# Patient Record
Sex: FEMALE | Race: OTHER | HISPANIC OR LATINO | URBAN - METROPOLITAN AREA
[De-identification: names, ages, dates, MRNs, and addresses within clinical notes are randomized per-mention and may not be internally consistent; named-entity substitution may affect disease eponyms.]

---

## 2022-03-14 ENCOUNTER — EMERGENCY (EMERGENCY)
Facility: HOSPITAL | Age: 52
LOS: 1 days | Discharge: HOME | End: 2022-03-14
Attending: STUDENT IN AN ORGANIZED HEALTH CARE EDUCATION/TRAINING PROGRAM | Admitting: STUDENT IN AN ORGANIZED HEALTH CARE EDUCATION/TRAINING PROGRAM
Payer: MEDICAID

## 2022-03-14 VITALS
TEMPERATURE: 97 F | OXYGEN SATURATION: 100 % | HEART RATE: 75 BPM | DIASTOLIC BLOOD PRESSURE: 55 MMHG | RESPIRATION RATE: 18 BRPM | SYSTOLIC BLOOD PRESSURE: 116 MMHG

## 2022-03-14 PROCEDURE — 99283 EMERGENCY DEPT VISIT LOW MDM: CPT

## 2022-03-14 PROCEDURE — 73564 X-RAY EXAM KNEE 4 OR MORE: CPT | Mod: 26,RT

## 2022-03-14 RX ORDER — IBUPROFEN 200 MG
400 TABLET ORAL ONCE
Refills: 0 | Status: COMPLETED | OUTPATIENT
Start: 2022-03-14 | End: 2022-03-14

## 2022-03-14 RX ORDER — ACETAMINOPHEN 500 MG
650 TABLET ORAL ONCE
Refills: 0 | Status: COMPLETED | OUTPATIENT
Start: 2022-03-14 | End: 2022-03-14

## 2022-03-14 RX ADMIN — Medication 400 MILLIGRAM(S): at 12:54

## 2022-03-14 RX ADMIN — Medication 650 MILLIGRAM(S): at 12:54

## 2022-03-14 NOTE — ED PROVIDER NOTE - NS ED ROS FT
CONSTITUTIONAL: No fever,  Pulmonary: no cough  CARDIOVASCULAR: No chest pain,  GI: No abdominal pain  MUSKULOSKELETAL: New pain in joints/muscles  ALL OTHER SYSTEMS NEGATIVE.

## 2022-03-14 NOTE — ED PROVIDER NOTE - PATIENT PORTAL LINK FT
You can access the FollowMyHealth Patient Portal offered by [facility] by registering at the following website: [email or web address]. By joining [employer]’s FollowMyHealth portal, you will also be able to view your health information using other applications (apps) compatible with our system.

## 2022-03-14 NOTE — ED PROVIDER NOTE - PHYSICAL EXAMINATION
CONSTITUTIONAL: Non-toxic, non-diaphoretic, in no apparent distress  HEAD: Normocephalic; atruamatic  EYES: EOM intact   ENMT: External appears normal; normal oropharynx, moist  NECK: grossly normal active ROM,  CARD: No cyanosis, good peripheral perfusion,  RESP: Normal chest excursion with respiration; no increased work of breathing  ABD: non-distended   EXT: Knees symmetric. No pain on passive ROM. No medial or lateral laxity. Negative anterior drawer test. NVI.  SKIN: Warm, dry, no rash  NEURO:  moving all extremities, no facial droop, no dysarthria      cn2-12 intact  5/5 all extremities

## 2022-03-14 NOTE — ED PROVIDER NOTE - PROGRESS NOTE DETAILS
the patient feels better, given ortho follow up. Return precautions reviewed. Patient verbalized understand of conditions for return and plan for follow up.

## 2022-03-14 NOTE — ED PROVIDER NOTE - NSFOLLOWUPINSTRUCTIONS_ED_ALL_ED_FT
[place] 400 mg de ibuprofeno cado 6 horas y tome 650 mg de acetaminophen o paracetamol cada 6 horas con comida tenga dolor.       LO QUE NECESITA SABER:  ¿Qué es la osteoartritis?  La osteoartritis ocurre cuando el cartílago (tejido que amortigua keysha articulación) se desgasta lentamente y causa que los huesos rocen entre ellos. Esta enfermedad es keysha afección a wendi plazo que con frecuencia afecta las timi, wilner, parte baja de la espalda, rodillas y caderas. La osteoartritis también se conoce reza artrosis o enfermedad degenerativa de las articulaciones.      ¿Qué aumenta mi riesgo de osteoartritis?  Ser mayor de 50 años  Un historial familiar de osteoartritis  Obesidad, hipertensión o azúcar kait en la david  Keysha lesión o infección en keysha articulación  Realiza movimientos repetitivos de ginette articulaciones en el trabajo o al practicar deportes  ¿Cuáles son los signos y síntomas de la osteoartritis?  Dolor en la articulación que empeora cuando usted mueve la articulación  Rigidez en la articulación que disminuye después que usted mueve la articulación  Disminución del rango de movimiento  Un crecimiento jessika de los huesos de los dedos de las timi y pies  Un cathie de raspado o un crujido cuando usted mueve baptiste articulación  ¿Cómo se diagnostica la osteoartritis?  Las radiografías son imágenes de los huesos en baptiste articulación. Un medio de contraste puede ser inyectado en baptiste articulación antes de samantha las radiografías. El medio de contraste ayudará a que baptiste articulación se pueda apreciar con más claridad en las radiografías. Dígale al médico si usted alguna vez ha tenido keysha reacción alérgica al líquido de contraste.    ¿Cómo se trata la osteoartritis?  Los objetivos del tratamiento para la osteoartritis son disminuir el dolor, fortalecer y mejorar el movimiento de la articulación. Es posible que usted necesite alguno de los siguientes:    Medicamentos:  Acetaminofén jose m el dolor y baja la fiebre. Está disponible sin receta médica. Pregunte la cantidad y la frecuencia con que debe tomarlos. Siga las indicaciones. Aleksander las etiquetas de todos los demás medicamentos que esté usando para saber si también contienen acetaminofén, o pregunte a baptiste médico o farmacéutico. El acetaminofén puede causar daño en el hígado cuando no se cecy de forma correcta. No use más de 4 gramos (4000 miligramos) en total de acetaminofeno en un día.  Los JEFFREY, reza el ibuprofeno, ayudan a disminuir la inflamación, el dolor y la fiebre. Donna medicamento está disponible con o sin keysha receta médica. Los JEFFREY pueden causar sangrado estomacal o problemas renales en ciertas personas. Si usted cecy un medicamento anticoagulante, siempre pregúntele a baptiste médico si los JEFFREY son seguros para usted. Siempre aleksander la etiqueta de donna medicamento y siga las instrucciones.  La crema de capsaicina puede ayudar a disminuir el dolor en baptiste articulación.  Puede administrarse podrían administrarse. Pregunte al médico cómo debe samantha donna medicamento de forma sarmiento. Algunos medicamentos recetados para el dolor contienen acetaminofén. No tome otros medicamentos que contengan acetaminofén sin consultarlo con baptiste médico. Demasiado acetaminofeno puede causar daño al hígado. Los medicamentos recetados para el dolor podrían causar estreñimiento. Pregunte a baptiste médico reza prevenir o tratar estreñimiento.  Keysha inyección de esteroides puede darse si ginette síntomas empeoran.  La fisioterapia se podría usar para enseñarle los ejercicios para mejorar el movimiento y la fortaleza, con el fin de disminuir el dolor en las articulaciones. Un fisioterapeuta puede  un área con ginette timi. Por ejemplo, puede  la pierna de ciertas maneras para tratar la osteoartritis en la cadera.  Un ultrasonido se puede usar para tratar la osteoartritis en ciertas áreas, reza la rodilla. El ultrasonido produce calor que puede aliviar el dolor.  La cirugía podría usarse si otros tratamientos no funcionan.    ¿Cómo puedo controlar los síntomas?  Manténgase activo. La actividad física puede reducir el dolor y mejorar baptiste habilidad de hacer ginette actividades diarias. Evite actividades que le causen dolor. Pregúntele a baptiste médico qué tipos de ejercicios son los adecuados para usted.  Mantenga un peso saludable. [place] ayuda a disminuir la presión en las articulaciones en baptiste espalda, caderas, rodillas, tobillos y pies. Pregúntele a baptiste médico cuál es el peso ideal para usted. Puede ayudarlo a crear un plan saludable de pérdida de peso si usted tiene sobrepeso.  Use calor o hielo en ginette articulaciones según le hayan indicado. El calor o el hielo pueden ayudar a disminuir el dolor, la inflamación y los espasmos musculares. Para el calor, use keysha almohadilla de calor eléctrica en temperatura baja lico 20 minutos o tome un baño tibio. Para el hielo, use un paquete de hielo o ponga hielo molido dentro de keysha bolsa plástica. [place]úbrala con keysha toalla antes de colocarla sobre la articulación. Aplique hielo lico 15 minutos cada hora.  Masajee los músculos alrededor de la articulación. Los masajes ayudan a aliviar el dolor y la rigidez. Baptiste médico o baptiste fisioterapeuta le puede indicar con qué frecuencia necesita hacerlos. Si usted tiene osteoartritis de cadera, es posible que otra persona necesite ayudarlo a masajear el área.  Use un bastón, unas muletas o un caminador si se lo indican. Estos ayudan a proteger y aliviar la presión en baptiste tobillo, rodilla y articulaciones de la cadera. También le pueden ordenar plantillas ortopédicas para ginette zapatos para disminuir la presión de ginette articulaciones.  Use zapatos sin tacones o de tacones bajos. [place] le servirá para aliviar el dolor y a disminuir la presión que se ejerce en las articulaciones de baptiste tobillo, rodilla y caderas.  ¿Cuándo obi llamar a mi médico?  Usted tiene dolor intenso.  Usted no puede  la articulación.  Tiene fiebre.  Baptiste articulación está yumiko y sensible.  Usted tiene preguntas o inquietudes acerca de baptiste condición o cuidado.  ACUERDOS SOBRE BAPTISTE CUIDADO:  Usted tiene el derecho de ayudar a planear baptiste cuidado. Aprenda todo lo que pueda sobre baptiste condición y reza darle tratamiento. Discuta ginette opciones de tratamiento con ginette médicos para decidir el cuidado que usted desea recibir. Usted siempre tiene el derecho de rechazar el tratamiento.Esta información es sólo para uso en educación. Baptiste intención no es darle un consejo médico sobre enfermedades o tratamientos. Colsulte con baptiste médico, enfermera o farmacéutico antes de seguir cualquier régimen médico para saber si es seguro y efectivo para usted.  Learn more about Osteoartritis  Care guides  Arthritis  Osteoarthritis  Further information  Always consult your healthcare provider to ensure the information displayed on this page applies to your personal circumstances.

## 2022-03-15 PROBLEM — E11.9 TYPE 2 DIABETES MELLITUS WITHOUT COMPLICATIONS: Chronic | Status: ACTIVE | Noted: 2022-03-14

## 2022-03-15 PROBLEM — Z00.00 ENCOUNTER FOR PREVENTIVE HEALTH EXAMINATION: Status: ACTIVE | Noted: 2022-03-15

## 2022-03-23 ENCOUNTER — NON-APPOINTMENT (OUTPATIENT)
Age: 52
End: 2022-03-23

## 2022-03-23 ENCOUNTER — APPOINTMENT (OUTPATIENT)
Dept: ORTHOPEDIC SURGERY | Facility: CLINIC | Age: 52
End: 2022-03-23
Payer: SELF-PAY

## 2022-03-23 VITALS
HEART RATE: 73 BPM | OXYGEN SATURATION: 98 % | BODY MASS INDEX: 32.78 KG/M2 | DIASTOLIC BLOOD PRESSURE: 72 MMHG | SYSTOLIC BLOOD PRESSURE: 112 MMHG | WEIGHT: 185 LBS | HEIGHT: 63 IN

## 2022-03-23 DIAGNOSIS — S89.91XA UNSPECIFIED INJURY OF RIGHT LOWER LEG, INITIAL ENCOUNTER: ICD-10-CM

## 2022-03-23 PROCEDURE — 20611 DRAIN/INJ JOINT/BURSA W/US: CPT | Mod: RT

## 2022-03-23 PROCEDURE — 99214 OFFICE O/P EST MOD 30 MIN: CPT | Mod: 25

## 2022-04-23 ENCOUNTER — INPATIENT (INPATIENT)
Facility: HOSPITAL | Age: 52
LOS: 2 days | Discharge: HOME | End: 2022-04-26
Attending: STUDENT IN AN ORGANIZED HEALTH CARE EDUCATION/TRAINING PROGRAM | Admitting: STUDENT IN AN ORGANIZED HEALTH CARE EDUCATION/TRAINING PROGRAM
Payer: SELF-PAY

## 2022-04-23 VITALS
SYSTOLIC BLOOD PRESSURE: 132 MMHG | TEMPERATURE: 98 F | DIASTOLIC BLOOD PRESSURE: 54 MMHG | HEART RATE: 67 BPM | RESPIRATION RATE: 18 BRPM | OXYGEN SATURATION: 100 %

## 2022-04-23 PROCEDURE — 99053 MED SERV 10PM-8AM 24 HR FAC: CPT

## 2022-04-23 PROCEDURE — 99285 EMERGENCY DEPT VISIT HI MDM: CPT

## 2022-04-24 ENCOUNTER — TRANSCRIPTION ENCOUNTER (OUTPATIENT)
Age: 52
End: 2022-04-24

## 2022-04-24 DIAGNOSIS — E11.9 TYPE 2 DIABETES MELLITUS WITHOUT COMPLICATIONS: ICD-10-CM

## 2022-04-24 DIAGNOSIS — J45.909 UNSPECIFIED ASTHMA, UNCOMPLICATED: ICD-10-CM

## 2022-04-24 DIAGNOSIS — K29.80 DUODENITIS WITHOUT BLEEDING: ICD-10-CM

## 2022-04-24 DIAGNOSIS — E03.9 HYPOTHYROIDISM, UNSPECIFIED: ICD-10-CM

## 2022-04-24 DIAGNOSIS — R74.8 ABNORMAL LEVELS OF OTHER SERUM ENZYMES: ICD-10-CM

## 2022-04-24 DIAGNOSIS — Z02.9 ENCOUNTER FOR ADMINISTRATIVE EXAMINATIONS, UNSPECIFIED: ICD-10-CM

## 2022-04-24 DIAGNOSIS — R10.9 UNSPECIFIED ABDOMINAL PAIN: ICD-10-CM

## 2022-04-24 LAB
ALBUMIN SERPL ELPH-MCNC: 3.8 G/DL — SIGNIFICANT CHANGE UP (ref 3.3–5)
ALBUMIN SERPL ELPH-MCNC: 3.9 G/DL — SIGNIFICANT CHANGE UP (ref 3.3–5)
ALP SERPL-CCNC: 148 U/L — HIGH (ref 40–120)
ALP SERPL-CCNC: 175 U/L — HIGH (ref 40–120)
ALT FLD-CCNC: 602 U/L — HIGH (ref 4–33)
ALT FLD-CCNC: 625 U/L — HIGH (ref 4–33)
ANION GAP SERPL CALC-SCNC: 11 MMOL/L — SIGNIFICANT CHANGE UP (ref 7–14)
ANION GAP SERPL CALC-SCNC: 13 MMOL/L — SIGNIFICANT CHANGE UP (ref 7–14)
APPEARANCE UR: CLEAR — SIGNIFICANT CHANGE UP
AST SERPL-CCNC: 285 U/L — HIGH (ref 4–32)
AST SERPL-CCNC: 293 U/L — HIGH (ref 4–32)
BASOPHILS # BLD AUTO: 0.01 K/UL — SIGNIFICANT CHANGE UP (ref 0–0.2)
BASOPHILS NFR BLD AUTO: 0.2 % — SIGNIFICANT CHANGE UP (ref 0–2)
BILIRUB SERPL-MCNC: 0.7 MG/DL — SIGNIFICANT CHANGE UP (ref 0.2–1.2)
BILIRUB SERPL-MCNC: 0.9 MG/DL — SIGNIFICANT CHANGE UP (ref 0.2–1.2)
BILIRUB UR-MCNC: NEGATIVE — SIGNIFICANT CHANGE UP
BLOOD GAS VENOUS COMPREHENSIVE RESULT: SIGNIFICANT CHANGE UP
BUN SERPL-MCNC: 10 MG/DL — SIGNIFICANT CHANGE UP (ref 7–23)
BUN SERPL-MCNC: 14 MG/DL — SIGNIFICANT CHANGE UP (ref 7–23)
CALCIUM SERPL-MCNC: 8.6 MG/DL — SIGNIFICANT CHANGE UP (ref 8.4–10.5)
CALCIUM SERPL-MCNC: 8.8 MG/DL — SIGNIFICANT CHANGE UP (ref 8.4–10.5)
CHLORIDE SERPL-SCNC: 101 MMOL/L — SIGNIFICANT CHANGE UP (ref 98–107)
CHLORIDE SERPL-SCNC: 102 MMOL/L — SIGNIFICANT CHANGE UP (ref 98–107)
CO2 SERPL-SCNC: 20 MMOL/L — LOW (ref 22–31)
CO2 SERPL-SCNC: 22 MMOL/L — SIGNIFICANT CHANGE UP (ref 22–31)
COLOR SPEC: SIGNIFICANT CHANGE UP
CREAT SERPL-MCNC: 0.46 MG/DL — LOW (ref 0.5–1.3)
CREAT SERPL-MCNC: 0.62 MG/DL — SIGNIFICANT CHANGE UP (ref 0.5–1.3)
DIFF PNL FLD: NEGATIVE — SIGNIFICANT CHANGE UP
EGFR: 108 ML/MIN/1.73M2 — SIGNIFICANT CHANGE UP
EGFR: 116 ML/MIN/1.73M2 — SIGNIFICANT CHANGE UP
EOSINOPHIL # BLD AUTO: 0.16 K/UL — SIGNIFICANT CHANGE UP (ref 0–0.5)
EOSINOPHIL NFR BLD AUTO: 2.5 % — SIGNIFICANT CHANGE UP (ref 0–6)
GLUCOSE BLDC GLUCOMTR-MCNC: 100 MG/DL — HIGH (ref 70–99)
GLUCOSE BLDC GLUCOMTR-MCNC: 162 MG/DL — HIGH (ref 70–99)
GLUCOSE BLDC GLUCOMTR-MCNC: 171 MG/DL — HIGH (ref 70–99)
GLUCOSE SERPL-MCNC: 112 MG/DL — HIGH (ref 70–99)
GLUCOSE SERPL-MCNC: 151 MG/DL — HIGH (ref 70–99)
GLUCOSE UR QL: ABNORMAL
HCT VFR BLD CALC: 41.9 % — SIGNIFICANT CHANGE UP (ref 34.5–45)
HGB BLD-MCNC: 13.8 G/DL — SIGNIFICANT CHANGE UP (ref 11.5–15.5)
IANC: 3.59 K/UL — SIGNIFICANT CHANGE UP (ref 1.8–7.4)
IMM GRANULOCYTES NFR BLD AUTO: 0.3 % — SIGNIFICANT CHANGE UP (ref 0–1.5)
KETONES UR-MCNC: NEGATIVE — SIGNIFICANT CHANGE UP
LEUKOCYTE ESTERASE UR-ACNC: NEGATIVE — SIGNIFICANT CHANGE UP
LIDOCAIN IGE QN: 49 U/L — SIGNIFICANT CHANGE UP (ref 7–60)
LYMPHOCYTES # BLD AUTO: 1.89 K/UL — SIGNIFICANT CHANGE UP (ref 1–3.3)
LYMPHOCYTES # BLD AUTO: 29.3 % — SIGNIFICANT CHANGE UP (ref 13–44)
MAGNESIUM SERPL-MCNC: 2.8 MG/DL — HIGH (ref 1.6–2.6)
MCHC RBC-ENTMCNC: 30.1 PG — SIGNIFICANT CHANGE UP (ref 27–34)
MCHC RBC-ENTMCNC: 32.9 GM/DL — SIGNIFICANT CHANGE UP (ref 32–36)
MCV RBC AUTO: 91.3 FL — SIGNIFICANT CHANGE UP (ref 80–100)
MONOCYTES # BLD AUTO: 0.77 K/UL — SIGNIFICANT CHANGE UP (ref 0–0.9)
MONOCYTES NFR BLD AUTO: 12 % — SIGNIFICANT CHANGE UP (ref 2–14)
NEUTROPHILS # BLD AUTO: 3.59 K/UL — SIGNIFICANT CHANGE UP (ref 1.8–7.4)
NEUTROPHILS NFR BLD AUTO: 55.7 % — SIGNIFICANT CHANGE UP (ref 43–77)
NITRITE UR-MCNC: NEGATIVE — SIGNIFICANT CHANGE UP
NRBC # BLD: 0 /100 WBCS — SIGNIFICANT CHANGE UP
NRBC # FLD: 0 K/UL — SIGNIFICANT CHANGE UP
PH UR: 8 — SIGNIFICANT CHANGE UP (ref 5–8)
PHOSPHATE SERPL-MCNC: 3.2 MG/DL — SIGNIFICANT CHANGE UP (ref 2.5–4.5)
PLATELET # BLD AUTO: 249 K/UL — SIGNIFICANT CHANGE UP (ref 150–400)
POTASSIUM SERPL-MCNC: 3.7 MMOL/L — SIGNIFICANT CHANGE UP (ref 3.5–5.3)
POTASSIUM SERPL-MCNC: 5.4 MMOL/L — HIGH (ref 3.5–5.3)
POTASSIUM SERPL-SCNC: 3.7 MMOL/L — SIGNIFICANT CHANGE UP (ref 3.5–5.3)
POTASSIUM SERPL-SCNC: 5.4 MMOL/L — HIGH (ref 3.5–5.3)
PROT SERPL-MCNC: 7.3 G/DL — SIGNIFICANT CHANGE UP (ref 6–8.3)
PROT SERPL-MCNC: 8.1 G/DL — SIGNIFICANT CHANGE UP (ref 6–8.3)
PROT UR-MCNC: NEGATIVE — SIGNIFICANT CHANGE UP
RBC # BLD: 4.59 M/UL — SIGNIFICANT CHANGE UP (ref 3.8–5.2)
RBC # FLD: 13.5 % — SIGNIFICANT CHANGE UP (ref 10.3–14.5)
SARS-COV-2 RNA SPEC QL NAA+PROBE: SIGNIFICANT CHANGE UP
SODIUM SERPL-SCNC: 134 MMOL/L — LOW (ref 135–145)
SODIUM SERPL-SCNC: 135 MMOL/L — SIGNIFICANT CHANGE UP (ref 135–145)
SP GR SPEC: 1.02 — SIGNIFICANT CHANGE UP (ref 1–1.05)
TRIGL SERPL-MCNC: 187 MG/DL — HIGH
UROBILINOGEN FLD QL: SIGNIFICANT CHANGE UP
WBC # BLD: 6.44 K/UL — SIGNIFICANT CHANGE UP (ref 3.8–10.5)
WBC # FLD AUTO: 6.44 K/UL — SIGNIFICANT CHANGE UP (ref 3.8–10.5)

## 2022-04-24 PROCEDURE — 76705 ECHO EXAM OF ABDOMEN: CPT | Mod: 26

## 2022-04-24 PROCEDURE — 71045 X-RAY EXAM CHEST 1 VIEW: CPT | Mod: 26

## 2022-04-24 PROCEDURE — 99223 1ST HOSP IP/OBS HIGH 75: CPT | Mod: GC

## 2022-04-24 PROCEDURE — 74177 CT ABD & PELVIS W/CONTRAST: CPT | Mod: 26,MA

## 2022-04-24 RX ORDER — DEXTROSE 50 % IN WATER 50 %
25 SYRINGE (ML) INTRAVENOUS ONCE
Refills: 0 | Status: DISCONTINUED | OUTPATIENT
Start: 2022-04-24 | End: 2022-04-26

## 2022-04-24 RX ORDER — DEXTROSE 50 % IN WATER 50 %
12.5 SYRINGE (ML) INTRAVENOUS ONCE
Refills: 0 | Status: DISCONTINUED | OUTPATIENT
Start: 2022-04-24 | End: 2022-04-26

## 2022-04-24 RX ORDER — ONDANSETRON 8 MG/1
4 TABLET, FILM COATED ORAL EVERY 8 HOURS
Refills: 0 | Status: DISCONTINUED | OUTPATIENT
Start: 2022-04-24 | End: 2022-04-26

## 2022-04-24 RX ORDER — ALBUTEROL 90 UG/1
2 AEROSOL, METERED ORAL
Qty: 0 | Refills: 0 | DISCHARGE

## 2022-04-24 RX ORDER — ALBUTEROL 90 UG/1
2 AEROSOL, METERED ORAL EVERY 6 HOURS
Refills: 0 | Status: DISCONTINUED | OUTPATIENT
Start: 2022-04-24 | End: 2022-04-26

## 2022-04-24 RX ORDER — OMEGA-3 ACID ETHYL ESTERS 1 G
1 CAPSULE ORAL
Qty: 0 | Refills: 0 | DISCHARGE

## 2022-04-24 RX ORDER — PANTOPRAZOLE SODIUM 20 MG/1
40 TABLET, DELAYED RELEASE ORAL EVERY 12 HOURS
Refills: 0 | Status: DISCONTINUED | OUTPATIENT
Start: 2022-04-24 | End: 2022-04-24

## 2022-04-24 RX ORDER — LANOLIN ALCOHOL/MO/W.PET/CERES
3 CREAM (GRAM) TOPICAL AT BEDTIME
Refills: 0 | Status: DISCONTINUED | OUTPATIENT
Start: 2022-04-24 | End: 2022-04-26

## 2022-04-24 RX ORDER — EMPAGLIFLOZIN 10 MG/1
1 TABLET, FILM COATED ORAL
Qty: 0 | Refills: 0 | DISCHARGE

## 2022-04-24 RX ORDER — DEXTROSE 50 % IN WATER 50 %
15 SYRINGE (ML) INTRAVENOUS ONCE
Refills: 0 | Status: DISCONTINUED | OUTPATIENT
Start: 2022-04-24 | End: 2022-04-26

## 2022-04-24 RX ORDER — INSULIN LISPRO 100/ML
VIAL (ML) SUBCUTANEOUS
Refills: 0 | Status: DISCONTINUED | OUTPATIENT
Start: 2022-04-24 | End: 2022-04-26

## 2022-04-24 RX ORDER — CHOLECALCIFEROL (VITAMIN D3) 125 MCG
1 CAPSULE ORAL
Qty: 0 | Refills: 0 | DISCHARGE

## 2022-04-24 RX ORDER — FAMOTIDINE 10 MG/ML
20 INJECTION INTRAVENOUS DAILY
Refills: 0 | Status: DISCONTINUED | OUTPATIENT
Start: 2022-04-24 | End: 2022-04-24

## 2022-04-24 RX ORDER — INSULIN LISPRO 100/ML
VIAL (ML) SUBCUTANEOUS AT BEDTIME
Refills: 0 | Status: DISCONTINUED | OUTPATIENT
Start: 2022-04-24 | End: 2022-04-26

## 2022-04-24 RX ORDER — FAMOTIDINE 10 MG/ML
20 INJECTION INTRAVENOUS ONCE
Refills: 0 | Status: COMPLETED | OUTPATIENT
Start: 2022-04-24 | End: 2022-04-24

## 2022-04-24 RX ORDER — SODIUM CHLORIDE 9 MG/ML
1000 INJECTION INTRAMUSCULAR; INTRAVENOUS; SUBCUTANEOUS ONCE
Refills: 0 | Status: COMPLETED | OUTPATIENT
Start: 2022-04-24 | End: 2022-04-24

## 2022-04-24 RX ORDER — LEVOTHYROXINE SODIUM 125 MCG
75 TABLET ORAL DAILY
Refills: 0 | Status: DISCONTINUED | OUTPATIENT
Start: 2022-04-24 | End: 2022-04-26

## 2022-04-24 RX ORDER — LEVOTHYROXINE SODIUM 125 MCG
1 TABLET ORAL
Qty: 0 | Refills: 0 | DISCHARGE

## 2022-04-24 RX ORDER — MORPHINE SULFATE 50 MG/1
4 CAPSULE, EXTENDED RELEASE ORAL ONCE
Refills: 0 | Status: DISCONTINUED | OUTPATIENT
Start: 2022-04-24 | End: 2022-04-24

## 2022-04-24 RX ORDER — CHOLECALCIFEROL (VITAMIN D3) 125 MCG
1000 CAPSULE ORAL DAILY
Refills: 0 | Status: DISCONTINUED | OUTPATIENT
Start: 2022-04-24 | End: 2022-04-26

## 2022-04-24 RX ORDER — ENOXAPARIN SODIUM 100 MG/ML
40 INJECTION SUBCUTANEOUS EVERY 24 HOURS
Refills: 0 | Status: DISCONTINUED | OUTPATIENT
Start: 2022-04-24 | End: 2022-04-26

## 2022-04-24 RX ORDER — SITAGLIPTIN AND METFORMIN HYDROCHLORIDE 500; 50 MG/1; MG/1
1 TABLET, FILM COATED ORAL
Qty: 0 | Refills: 0 | DISCHARGE

## 2022-04-24 RX ORDER — OMEGA-3 ACID ETHYL ESTERS 1 G
4 CAPSULE ORAL DAILY
Refills: 0 | Status: DISCONTINUED | OUTPATIENT
Start: 2022-04-24 | End: 2022-04-26

## 2022-04-24 RX ORDER — GLUCAGON INJECTION, SOLUTION 0.5 MG/.1ML
1 INJECTION, SOLUTION SUBCUTANEOUS ONCE
Refills: 0 | Status: DISCONTINUED | OUTPATIENT
Start: 2022-04-24 | End: 2022-04-26

## 2022-04-24 RX ADMIN — Medication 4 GRAM(S): at 17:26

## 2022-04-24 RX ADMIN — SODIUM CHLORIDE 1000 MILLILITER(S): 9 INJECTION INTRAMUSCULAR; INTRAVENOUS; SUBCUTANEOUS at 00:48

## 2022-04-24 RX ADMIN — FAMOTIDINE 20 MILLIGRAM(S): 10 INJECTION INTRAVENOUS at 00:48

## 2022-04-24 RX ADMIN — Medication 1000 UNIT(S): at 17:26

## 2022-04-24 RX ADMIN — Medication 1: at 18:19

## 2022-04-24 RX ADMIN — MORPHINE SULFATE 4 MILLIGRAM(S): 50 CAPSULE, EXTENDED RELEASE ORAL at 00:48

## 2022-04-24 RX ADMIN — Medication 30 MILLILITER(S): at 00:47

## 2022-04-24 NOTE — ED PROVIDER NOTE - CLINICAL SUMMARY MEDICAL DECISION MAKING FREE TEXT BOX
51 y/o F p/w epigastric pain. Differential diagnosis includes: pineda, pud, hernia, pancreatitis, gastritis. Abdominal exam without peritoneal signs. stable vitals   Plan: labs, ruq us, pain control, serial reassessment

## 2022-04-24 NOTE — DISCHARGE NOTE PROVIDER - NSDCMRMEDTOKEN_GEN_ALL_CORE_FT
Albuterol (Eqv-ProAir HFA) 90 mcg/inh inhalation aerosol: 2 puff(s) inhaled every 6 hours, As Needed  Janumet 50 mg-1000 mg oral tablet: 1 tab(s) orally 2 times a day  Jardiance 10 mg oral tablet: 1 tab(s) orally once a day (in the morning)  levothyroxine 75 mcg (0.075 mg) oral tablet: 1 tab(s) orally once a day  Omega-3 350 mg oral capsule: 1 cap(s) orally once a day  Vitamin D3 25 mcg (1000 intl units) oral tablet: 1 tab(s) orally once a day   Albuterol (Eqv-ProAir HFA) 90 mcg/inh inhalation aerosol: 2 puff(s) inhaled every 6 hours, As Needed  Janumet 50 mg-1000 mg oral tablet: 1 tab(s) orally 2 times a day  Jardiance 10 mg oral tablet: 1 tab(s) orally once a day (in the morning)  levothyroxine 75 mcg (0.075 mg) oral tablet: 1 tab(s) orally once a day  Omega-3 350 mg oral capsule: 1 cap(s) orally once a day  pantoprazole 40 mg oral delayed release tablet: 1 tab(s) orally once a day   Vitamin D3 25 mcg (1000 intl units) oral tablet: 1 tab(s) orally once a day

## 2022-04-24 NOTE — ED PROVIDER NOTE - ATTENDING CONTRIBUTION TO CARE
HPI: 53 y/o F hx of dm, hypothyroid p/w epigastric pain x 1 week. decrease PO intake due to pain, worsens after eating. mostly upper abd, going towards RUQ, intermittent 8/10, sharp.   pt denies any fever, chills, cp, palpitations, sob, v/d, dysuria, back pain  EXAM: Uncomfortable, + murphys, tenderness to palpation epigastrium without masses/rebound/guarding, no CVAT, skin without changes, no jaundice, heart RRR, lungs ctab.   MDM: pt with hypothyroid and NIDDM that presents with epigastric and RUQ pain with + murphys on exam. Likely gallstones vs pancreatitis. Does not appear jaundice. Will obtain labs, imaging, provide pain meds and IVF and reassess.

## 2022-04-24 NOTE — PATIENT PROFILE ADULT - FALL HARM RISK - HARM RISK INTERVENTIONS

## 2022-04-24 NOTE — ED PROVIDER NOTE - OBJECTIVE STATEMENT
53 y/o F hx of dm, hypothyroid p/w epigastric pain x 1 week. decrease PO intake due to pain, worsens after eating. mostly upper abd, going towards right side, intermittent 8/10, sharp.   pt denies any fever, chills, cp, palpitations, sob, v/d, dysuria, back pain

## 2022-04-24 NOTE — H&P ADULT - ASSESSMENT
52F pmh T2DM, hypothyroidism, LI, asthma, osteoarthritis who presents with 1 week epigastric pain, found to have duodenitis on CT abdomen concerning for peptic ulcer disease.

## 2022-04-24 NOTE — H&P ADULT - NSHPLABSRESULTS_GEN_ALL_CORE
13.8   6.44  )-----------( 249      ( 2022 01:17 )             41.9           134<L>  |  101  |  14  ----------------------------<  151<H>  5.4<H>   |  20<L>  |  0.62    Ca    8.8      2022 01:17    TPro  8.1  /  Alb  3.9  /  TBili  0.7  /  DBili  x   /  AST  293<H>  /  ALT  625<H>  /  AlkPhos  175<H>            Urinalysis Basic - ( 2022 07:01 )    Color: Light Yellow / Appearance: Clear / S.020 / pH: x  Gluc: x / Ketone: Negative  / Bili: Negative / Urobili: <2 mg/dL   Blood: x / Protein: Negative / Nitrite: Negative   Leuk Esterase: Negative / RBC: x / WBC x   Sq Epi: x / Non Sq Epi: x / Bacteria: x      CAPILLARY BLOOD GLUCOSE  POCT Blood Glucose.: 100 mg/dL (2022 11:33)

## 2022-04-24 NOTE — DISCHARGE NOTE PROVIDER - NSFOLLOWUPCLINICS_GEN_ALL_ED_FT
[facility]  Gastroenterology  [address] [place] [id number]  Phone: [phone]  Fax:   Follow Up Time: Routine

## 2022-04-24 NOTE — H&P ADULT - ATTENDING COMMENTS
52F pw abd pain found to have duodenitis on CT. s/p Maalox in ED with significant improvement. admit for further evaluation. at time of exam. minimal epigastric discomfort on deep palpation. agree with rule-out H. pylori. can be follow up with pcp for w/u if patient's symptoms improve quickly. elevated LFT, h/o LI. CMP monitor for now.

## 2022-04-24 NOTE — DISCHARGE NOTE PROVIDER - HOSPITAL COURSE
52F pmh T2DM, hypothyroidism, LI, asthma, osteoarthritis who presents with 1 week epigastric pain, found to have duodenitis on CT abdomen concerning for peptic ulcer disease.    CT abd/pelvis with evidence of duodenitis.    Transaminitis on CMP; increased from previous labs (to ~100s in 6/21).  Will send acute hepatitis panel, but given history of LI this may be chronic elevation.  Follows with outside GI.    Plan to test H pylori, start PPI, and discharge with initiation of triple therapy if H pylori positive, and GI follow up for eradication verification.

## 2022-04-24 NOTE — H&P ADULT - NSICDXPASTMEDICALHX_GEN_ALL_CORE_FT
PAST MEDICAL HISTORY:  Asthma     DM (diabetes mellitus)     Hypothyroidism     LI (nonalcoholic steatohepatitis)     Osteoarthritis

## 2022-04-24 NOTE — H&P ADULT - PROBLEM SELECTOR PLAN 1
- h pylori stool and breath test - CT abd with duodenitis, no perforation  - h pylori stool and breath test  - plan to start PPI after collection of H pylori samples  - GI f/u outpatient

## 2022-04-24 NOTE — H&P ADULT - NSHPREVIEWOFSYSTEMS_GEN_ALL_CORE
In additional the that documented in the HPI, the additional ROS was obtained:    CONSTITUTIONAL: No fever, no chills  EYES: no change in vision, no blurred vision  HEENT: no trouble swallowing, no trouble speaking, no sore throat  CV: no chest pain, no palpitations  RESP: no cough, no shortness of breath  GI: +abdominal pain, +nausea, no vomiting, no diarrhea, +constipation  : No dysuria, no frequency  NEURO: no headache, no new numbness, no weakness, no dizziness  SKIN: no new rashes  HEME: No easy bruising or bleeding  MSK: no recent trauma  Jagdish Pascual M.D. -Resident

## 2022-04-24 NOTE — H&P ADULT - NSHPPHYSICALEXAM_GEN_ALL_CORE
Vital signs reviewed.  CONSTITUTIONAL: NAD, awake  HEAD: Normocephalic; atraumatic  EYES: EOMI, no conjunctival injection, no scleral icterus  MOUTH/THROAT:  MMM  NECK: Trachea midline  CV: Normal S1, S2; no audible murmurs; extremities WWP  RESP: normal work of breathing; CTAB, no stridor  ABD: soft, non-distended; mildly tender in epigastrum  : Deferred  MSK/EXT: no edema, no limited ROM  SKIN: No rashes on exposed skin surfaces  NEURO: Moves all extremities spontaneously with no focal deficits, speech is appropriate

## 2022-04-24 NOTE — H&P ADULT - HISTORY OF PRESENT ILLNESS
52F with pmh T2DM, hypothyroidism, osteoarthritis, and LI who presents with 1 week of epigastric abdominal pain.  This pain is intermittent, 8/10 sharp, epigastric and RUQ, and worse post-prandial.  Associated with decreased PO intake and mild nausea.  +constipation last BM yesterday.  Daughter at bedside helps provide translation.    She has taken 2 tylenol in the past week but denies recent NSAID use.  Denies history gastritis.    She denies fever, chest pain, dyspnea on exertion, sob, vomiting.

## 2022-04-24 NOTE — DISCHARGE NOTE PROVIDER - NSDCFUSCHEDAPPT_GEN_ALL_CORE_FT
[facility]  Rad  [address]  Scheduled Appointment: 05/12/2022    Jimmy Farfan  [facility]  Hepatology [address]   Scheduled Appointment: 05/13/2022

## 2022-04-24 NOTE — ED ADULT NURSE NOTE - OBJECTIVE STATEMENT
pt. received to room 27 A&Ox4 ambulatory at baseline pmh of fatty liver p/w abdominal pain. pt. endorses pain for the last x6 days, pain waxes and wanes. pt. endorsing decreased PO intake since pain started. denies changes in bowel habits at this time. abdomen soft, tender in epigastric area, some guarding noted. no acute distress noted. respirations even and unlabored. denies SOB, HA. CP, NVD, fever, chills. MD Clark at bedside for eval. awaiting further orders. comfort measures provided. safety precautions maintained.

## 2022-04-24 NOTE — DISCHARGE NOTE PROVIDER - NSDCCPCAREPLAN_GEN_ALL_CORE_FT
PRINCIPAL DISCHARGE DIAGNOSIS  Diagnosis: Duodenitis  Assessment and Plan of Treatment: You were found to have abdominal pain with findings of duodenitis on CT abdomen/pelvis.  This is commonly associated with peptic ulcer disease.  The two most common causes are NSAID use and Helicobacter pylori infection.  You should avoid the use of NSAID pain killers (ibuprofen, motrin, advil, aleve, or naproxen).  We sent tests for Helicobacter pylori.  These results are pending, and you will be contacted if they are positive with prescriptions for triple therapy for eradication of this infection.  For now, you should start a daily PPI (protonix 40mg daily).  This was sent to your pharmacy.      SECONDARY DISCHARGE DIAGNOSES  Diagnosis: Abnormal transaminases  Assessment and Plan of Treatment: You were found to have elevated liver function tests    Diagnosis: Duodenitis  Assessment and Plan of Treatment:      PRINCIPAL DISCHARGE DIAGNOSIS  Diagnosis: Duodenitis  Assessment and Plan of Treatment: You were found to have abdominal pain with findings of duodenitis on CT abdomen/pelvis.  This is commonly associated with peptic ulcer disease.  The two most common causes are NSAID use and Helicobacter pylori infection.  You should avoid the use of NSAID pain killers (ibuprofen, motrin, advil, aleve, or naproxen).  We sent tests for Helicobacter pylori.  These results are pending, and you will be contacted if they are positive with prescriptions for triple therapy for eradication of this infection.  For now, you should start a daily PPI (protonix 40mg daily).  This was sent to your pharmacy.      SECONDARY DISCHARGE DIAGNOSES  Diagnosis: Abnormal transaminases  Assessment and Plan of Treatment: You were found to have elevated liver function tests.  The acute hepatitis panel was sent and was negative.  Please follow up with your Primary doctor in 1-2 weeks for repeat testing of these levels.  You should call your gastroenterologist for a follow up about this issue.  If you cannot see your gastroenterologist, [facility] gastroenterology contact information is included.     PRINCIPAL DISCHARGE DIAGNOSIS  Diagnosis: Duodenitis  Assessment and Plan of Treatment: You were found to have abdominal pain with findings of duodenitis on CT abdomen/pelvis.  This is commonly associated with peptic ulcer disease.  The two most common causes are NSAID use and Helicobacter pylori infection.  You should avoid the use of NSAID pain killers (ibuprofen, motrin, advil, aleve, or naproxen).  We sent tests for Helicobacter pylori.  These results are pending, and you will be contacted if they are positive with prescriptions for triple therapy for eradication of this infection.  For now, you should start a daily PPI (protonix 40mg daily), for at least 2 weeks (this may be extended by several days if you test positive for H pylori).  This was sent to your pharmacy.      SECONDARY DISCHARGE DIAGNOSES  Diagnosis: Abnormal transaminases  Assessment and Plan of Treatment: You were found to have elevated liver function tests.  The acute hepatitis panel was sent and was negative.  Please follow up with your Primary doctor in 1-2 weeks for repeat testing of these levels.  You should call your gastroenterologist for a follow up about this issue.  If you cannot see your gastroenterologist, [facility] gastroenterology contact information is included per your request.     PRINCIPAL DISCHARGE DIAGNOSIS  Diagnosis: Duodenitis  Assessment and Plan of Treatment: You were found to have abdominal pain with findings of duodenitis on CT abdomen/pelvis.  This is commonly associated with peptic ulcer disease.  The two most common causes are NSAID use and Helicobacter pylori infection.  You should avoid the use of NSAID pain killers (ibuprofen, motrin, advil, aleve, or naproxen).  We sent tests for Helicobacter pylori.  These results are pending, and you will be contacted if they are positive with prescriptions for triple therapy for eradication of this infection.  For now, you should start a daily PPI (protonix 40mg daily), for at least 2 weeks (this may be extended by several days if you test positive for H pylori).  This was sent to your pharmacy.      SECONDARY DISCHARGE DIAGNOSES  Diagnosis: Abnormal transaminases  Assessment and Plan of Treatment: You were found to have elevated liver function tests.  The acute hepatitis panel was sent and was negative.  Please follow up with your Primary doctor in 1-2 weeks for repeat testing of these levels.  You should call your gastroenterologist for a follow up about this issue.  If you cannot see your gastroenterologist, [facility] gastroenterology contact information is included per your request.    Diagnosis: Liver lesion  Assessment and Plan of Treatment: You were found to have a liver lesion on CT.  CT read as rounded hypodensity medial to the falciform ligament measuring up to 1.6 cm (2, 34), indeterminate, possibly focal fat deposition.  Per our discussions you were aware of fat infiltration of your liver so this may not be a new finding, please refer to previous imaging if available.  You should obtain an MRI of your liver outpatient to follow up this finding, if not already performed.  Please discuss this with your primary doctor and/or gastroenterologist.

## 2022-04-24 NOTE — H&P ADULT - PROBLEM SELECTOR PLAN 2
- SSI  - Hold home jardiance and janumet - initial sample severely hemolyzed, although AST/ALT quite elevated  - Bili normal  - repeat CMP now, trend  - given history of LI, may be some chronic elevation  - patient states only tylenol use is 2 pills in past week.

## 2022-04-24 NOTE — ED PROVIDER NOTE - NS ED ROS FT
Constitutional: No fever, chills.  Eyes:  No visual changes  ENMT:  No neck pain  Cardiac:  No chest pain  Respiratory:  No cough, SOB  GI:  No nausea, vomiting, diarrhea, +abdominal pain.  :  No dysuria, hematuria  MS:  No back pain.  Neuro:  No headache or lightheadedness  Skin:  No skin rash  Except as documented in the HPI,  all other systems are negative.

## 2022-04-24 NOTE — ED PROVIDER NOTE - PHYSICAL EXAMINATION
Vital signs reviewed  GENERAL: Patient nontoxic appearing, NAD  HEAD: NCAT  EYES: Anicteric  ENT: MMM  NECK: Supple, non tender  RESPIRATORY: Normal respiratory effort. CTA B/L. No wheezing, rales, rhonchi  CARDIOVASCULAR: Regular rate and rhythm  ABDOMEN: Soft. epigastric ttp, +murphys   MUSCULOSKELETAL/EXTREMITIES: Brisk cap refill. 2+ radial pulses. No leg edema.  SKIN:  Warm and dry  NEURO: AAOx3. No gross FND.  PSYCHIATRIC: Cooperative. Affect appropriate.

## 2022-04-25 DIAGNOSIS — R82.71 BACTERIURIA: ICD-10-CM

## 2022-04-25 LAB
A1C WITH ESTIMATED AVERAGE GLUCOSE RESULT: 6.9 % — HIGH (ref 4–5.6)
ALBUMIN SERPL ELPH-MCNC: 3.7 G/DL — SIGNIFICANT CHANGE UP (ref 3.3–5)
ALP SERPL-CCNC: 145 U/L — HIGH (ref 40–120)
ALT FLD-CCNC: 580 U/L — HIGH (ref 4–33)
ANION GAP SERPL CALC-SCNC: 12 MMOL/L — SIGNIFICANT CHANGE UP (ref 7–14)
AST SERPL-CCNC: 304 U/L — HIGH (ref 4–32)
BILIRUB SERPL-MCNC: 0.7 MG/DL — SIGNIFICANT CHANGE UP (ref 0.2–1.2)
BUN SERPL-MCNC: 18 MG/DL — SIGNIFICANT CHANGE UP (ref 7–23)
CALCIUM SERPL-MCNC: 8.6 MG/DL — SIGNIFICANT CHANGE UP (ref 8.4–10.5)
CHLORIDE SERPL-SCNC: 103 MMOL/L — SIGNIFICANT CHANGE UP (ref 98–107)
CO2 SERPL-SCNC: 21 MMOL/L — LOW (ref 22–31)
CREAT SERPL-MCNC: 0.52 MG/DL — SIGNIFICANT CHANGE UP (ref 0.5–1.3)
CULTURE RESULTS: SIGNIFICANT CHANGE UP
EGFR: 112 ML/MIN/1.73M2 — SIGNIFICANT CHANGE UP
ESTIMATED AVERAGE GLUCOSE: 151 — SIGNIFICANT CHANGE UP
GLUCOSE BLDC GLUCOMTR-MCNC: 146 MG/DL — HIGH (ref 70–99)
GLUCOSE BLDC GLUCOMTR-MCNC: 152 MG/DL — HIGH (ref 70–99)
GLUCOSE BLDC GLUCOMTR-MCNC: 158 MG/DL — HIGH (ref 70–99)
GLUCOSE BLDC GLUCOMTR-MCNC: 212 MG/DL — HIGH (ref 70–99)
GLUCOSE SERPL-MCNC: 162 MG/DL — HIGH (ref 70–99)
HAV IGM SER-ACNC: SIGNIFICANT CHANGE UP
HBV CORE IGM SER-ACNC: SIGNIFICANT CHANGE UP
HBV SURFACE AG SER-ACNC: SIGNIFICANT CHANGE UP
HCV AB S/CO SERPL IA: 0.33 S/CO — SIGNIFICANT CHANGE UP (ref 0–0.99)
HCV AB SERPL-IMP: SIGNIFICANT CHANGE UP
POTASSIUM SERPL-MCNC: 3.9 MMOL/L — SIGNIFICANT CHANGE UP (ref 3.5–5.3)
POTASSIUM SERPL-SCNC: 3.9 MMOL/L — SIGNIFICANT CHANGE UP (ref 3.5–5.3)
PROT SERPL-MCNC: 7.1 G/DL — SIGNIFICANT CHANGE UP (ref 6–8.3)
SODIUM SERPL-SCNC: 136 MMOL/L — SIGNIFICANT CHANGE UP (ref 135–145)
SPECIMEN SOURCE: SIGNIFICANT CHANGE UP
TSH SERPL-MCNC: 4.65 UIU/ML — HIGH (ref 0.27–4.2)

## 2022-04-25 PROCEDURE — 99233 SBSQ HOSP IP/OBS HIGH 50: CPT | Mod: GC

## 2022-04-25 RX ORDER — PANTOPRAZOLE SODIUM 20 MG/1
1 TABLET, DELAYED RELEASE ORAL
Qty: 30 | Refills: 0
Start: 2022-04-25 | End: 2022-05-24

## 2022-04-25 RX ORDER — PANTOPRAZOLE SODIUM 20 MG/1
40 TABLET, DELAYED RELEASE ORAL ONCE
Refills: 0 | Status: COMPLETED | OUTPATIENT
Start: 2022-04-25 | End: 2022-04-25

## 2022-04-25 RX ADMIN — Medication 1: at 13:00

## 2022-04-25 RX ADMIN — Medication 30 MILLILITER(S): at 22:27

## 2022-04-25 RX ADMIN — Medication 75 MICROGRAM(S): at 05:30

## 2022-04-25 RX ADMIN — Medication 4 GRAM(S): at 11:43

## 2022-04-25 RX ADMIN — Medication 1000 UNIT(S): at 11:43

## 2022-04-25 RX ADMIN — Medication 1: at 18:05

## 2022-04-25 RX ADMIN — PANTOPRAZOLE SODIUM 40 MILLIGRAM(S): 20 TABLET, DELAYED RELEASE ORAL at 13:43

## 2022-04-25 NOTE — PROGRESS NOTE ADULT - SUBJECTIVE AND OBJECTIVE BOX
***incomplete PROGRESS NOTE:     Patient is a 52y old  Female who presents with a chief complaint of Abdominal pain (2022 07:39)      SUBJECTIVE / OVERNIGHT EVENTS: No acute overnight events.  Tolerated food last night without significant pain.  Pain remains well controlled.  No more nausea.  Discussed results including pending hepatitis panel and stable/improving CMP liver enzymes.  Patient aware of need for stool sample.  [employer] #[id number] utilized, 8 mins 7 seconds of interpretation.      MEDICATIONS  (STANDING):  cholecalciferol 1000 Unit(s) Oral daily  dextrose 50% Injectable 25 Gram(s) IV Push once  dextrose 50% Injectable 12.5 Gram(s) IV Push once  dextrose 50% Injectable 25 Gram(s) IV Push once  dextrose Oral Gel 15 Gram(s) Oral once  enoxaparin Injectable 40 milliGRAM(s) SubCutaneous every 24 hours  glucagon  Injectable 1 milliGRAM(s) IntraMuscular once  insulin lispro (ADMELOG) corrective regimen sliding scale   SubCutaneous three times a day before meals  insulin lispro (ADMELOG) corrective regimen sliding scale   SubCutaneous at bedtime  levothyroxine 75 MICROGram(s) Oral daily  omega-3-Acid Ethyl Esters 4 Gram(s) Oral daily    MEDICATIONS  (PRN):  ALBUTerol    90 MICROgram(s) HFA Inhaler 2 Puff(s) Inhalation every 6 hours PRN Shortness of Breath and/or Wheezing  aluminum hydroxide/magnesium hydroxide/simethicone Suspension 30 milliLiter(s) Oral every 4 hours PRN Dyspepsia  melatonin 3 milliGRAM(s) Oral at bedtime PRN Insomnia  ondansetron Injectable 4 milliGRAM(s) IV Push every 8 hours PRN Nausea and/or Vomiting      CAPILLARY BLOOD GLUCOSE  POCT Blood Glucose.: 162 mg/dL (2022 22:26)  POCT Blood Glucose.: 171 mg/dL (2022 17:39)  POCT Blood Glucose.: 100 mg/dL (2022 11:33)    I&O's Summary      PHYSICAL EXAM:  Vital Signs Last 24 Hrs  T(C): 37 (2022 05:30), Max: 37.1 (2022 08:30)  T(F): 98.6 (2022 05:30), Max: 98.8 (2022 21:53)  HR: 61 (2022 05:30) (61 - 80)  BP: 104/60 (2022 05:30) (102/64 - 118/63)  BP(mean): --  RR: 17 (2022 05:30) (16 - 18)  SpO2: 98% (2022 05:30) (98% - 100%)    CONSTITUTIONAL: NAD, well-developed, A&Ox3 to person, place, time.  RESPIRATORY: Normal respiratory effort; lungs are clear to auscultation bilaterally  CARDIOVASCULAR: Regular rate and rhythm, normal S1 and S2, no murmur/rub/gallop; No lower extremity edema; Peripheral pulses are 2+ bilaterally  ABDOMEN: Nontender to palpation, no rebound/guarding; No hepatosplenomegaly  MUSCLOSKELETAL: no clubbing or cyanosis of digits; no joint swelling or tenderness to palpation  NEURO: CN 2-12 grossly intact, moves all limbs spontaneously      LABS:                        13.8   6.44  )-----------( 249      ( 2022 01:17 )             41.9     04-25    136  |  103  |  18  ----------------------------<  162<H>  3.9   |  21<L>  |  0.52    Ca    8.6      2022 06:54  Phos  3.2     04-24  Mg     2.80     04-24    TPro  7.1  /  Alb  3.7  /  TBili  0.7  /  DBili  x   /  AST  304<H>  /  ALT  580<H>  /  AlkPhos  145<H>  04-25      Urinalysis Basic - ( 2022 07:01 )    Color: Light Yellow / Appearance: Clear / S.020 / pH: x  Gluc: x / Ketone: Negative  / Bili: Negative / Urobili: <2 mg/dL   Blood: x / Protein: Negative / Nitrite: Negative   Leuk Esterase: Negative / RBC: x / WBC x   Sq Epi: x / Non Sq Epi: x / Bacteria: x          RADIOLOGY & ADDITIONAL TESTS:  Results Reviewed: y  Imaging Personally Reviewed:  Electrocardiogram Personally Reviewed:    COORDINATION OF CARE:  Care Discussed with Consultants/Other Providers [Y/N]:  Prior or Outpatient Records Reviewed [Y/N]:

## 2022-04-25 NOTE — PROGRESS NOTE ADULT - PROBLEM SELECTOR PLAN 1
- CT abd with duodenitis, no perforation  - h pylori stool and breath test  - plan to start PPI after collection of H pylori samples  - GI f/u outpatient - CT abd with duodenitis, no perforation  - h pylori stool test pending  - plan to start PPI after collection of H pylori samples  - GI f/u outpatient

## 2022-04-25 NOTE — PROGRESS NOTE ADULT - PROBLEM SELECTOR PLAN 3
- SSI  - Hold home jardiance and janumet - Patient denies urinary complaints  - GBS + in UCx  - No indication for treatment

## 2022-04-25 NOTE — PROGRESS NOTE ADULT - PROBLEM SELECTOR PLAN 2
- initial sample severely hemolyzed, although AST/ALT quite elevated  - Bili normal  - repeat CMP now, trend  - given history of LI, may be some chronic elevation  - patient states only tylenol use is 2 pills in past week. - liver enzymes stable/improving  - hepatitis panel pending

## 2022-04-26 ENCOUNTER — TRANSCRIPTION ENCOUNTER (OUTPATIENT)
Age: 52
End: 2022-04-26

## 2022-04-26 VITALS
HEART RATE: 72 BPM | DIASTOLIC BLOOD PRESSURE: 61 MMHG | RESPIRATION RATE: 18 BRPM | TEMPERATURE: 99 F | SYSTOLIC BLOOD PRESSURE: 114 MMHG | OXYGEN SATURATION: 96 %

## 2022-04-26 LAB
ALBUMIN SERPL ELPH-MCNC: 3.6 G/DL — SIGNIFICANT CHANGE UP (ref 3.3–5)
ALP SERPL-CCNC: 137 U/L — HIGH (ref 40–120)
ALT FLD-CCNC: 544 U/L — HIGH (ref 4–33)
ANION GAP SERPL CALC-SCNC: 11 MMOL/L — SIGNIFICANT CHANGE UP (ref 7–14)
AST SERPL-CCNC: 309 U/L — HIGH (ref 4–32)
BILIRUB SERPL-MCNC: 0.7 MG/DL — SIGNIFICANT CHANGE UP (ref 0.2–1.2)
BUN SERPL-MCNC: 15 MG/DL — SIGNIFICANT CHANGE UP (ref 7–23)
CALCIUM SERPL-MCNC: 8.7 MG/DL — SIGNIFICANT CHANGE UP (ref 8.4–10.5)
CHLORIDE SERPL-SCNC: 104 MMOL/L — SIGNIFICANT CHANGE UP (ref 98–107)
CO2 SERPL-SCNC: 23 MMOL/L — SIGNIFICANT CHANGE UP (ref 22–31)
CREAT SERPL-MCNC: 0.55 MG/DL — SIGNIFICANT CHANGE UP (ref 0.5–1.3)
EGFR: 111 ML/MIN/1.73M2 — SIGNIFICANT CHANGE UP
GLUCOSE BLDC GLUCOMTR-MCNC: 172 MG/DL — HIGH (ref 70–99)
GLUCOSE BLDC GLUCOMTR-MCNC: 177 MG/DL — HIGH (ref 70–99)
GLUCOSE SERPL-MCNC: 176 MG/DL — HIGH (ref 70–99)
POTASSIUM SERPL-MCNC: 3.8 MMOL/L — SIGNIFICANT CHANGE UP (ref 3.5–5.3)
POTASSIUM SERPL-SCNC: 3.8 MMOL/L — SIGNIFICANT CHANGE UP (ref 3.5–5.3)
PROT SERPL-MCNC: 7.2 G/DL — SIGNIFICANT CHANGE UP (ref 6–8.3)
SODIUM SERPL-SCNC: 138 MMOL/L — SIGNIFICANT CHANGE UP (ref 135–145)

## 2022-04-26 PROCEDURE — 99232 SBSQ HOSP IP/OBS MODERATE 35: CPT | Mod: GC

## 2022-04-26 RX ORDER — PANTOPRAZOLE SODIUM 20 MG/1
40 TABLET, DELAYED RELEASE ORAL
Refills: 0 | Status: DISCONTINUED | OUTPATIENT
Start: 2022-04-26 | End: 2022-04-26

## 2022-04-26 RX ADMIN — Medication 75 MICROGRAM(S): at 06:27

## 2022-04-26 RX ADMIN — PANTOPRAZOLE SODIUM 40 MILLIGRAM(S): 20 TABLET, DELAYED RELEASE ORAL at 09:52

## 2022-04-26 RX ADMIN — Medication 4 GRAM(S): at 11:21

## 2022-04-26 RX ADMIN — Medication 1: at 12:58

## 2022-04-26 RX ADMIN — Medication 1: at 09:14

## 2022-04-26 RX ADMIN — Medication 1000 UNIT(S): at 11:21

## 2022-04-26 NOTE — PROGRESS NOTE ADULT - PROBLEM SELECTOR PLAN 1
- CT abd with duodenitis, no perforation  - h pylori stool test pending  - plan to start PPI after collection of H pylori samples  - GI f/u outpatient - CT abd with duodenitis, no perforation  - h pylori stool test pending  - continue pantoprazole  - GI f/u outpatient

## 2022-04-26 NOTE — DISCHARGE NOTE NURSING/CASE MANAGEMENT/SOCIAL WORK - NSDCPEFALRISK_GEN_ALL_CORE
For information on Fall & Injury Prevention, visit: [email or web address] OR  [email or web address] OR  https://www.cdc.gov/steadi/patient.html

## 2022-04-26 NOTE — DISCHARGE NOTE NURSING/CASE MANAGEMENT/SOCIAL WORK - PATIENT PORTAL LINK FT
You can access the FollowMyHealth Patient Portal offered by [facility] by registering at the following website: [email or web address]. By joining [employer]’s FollowMyHealth portal, you will also be able to view your health information using other applications (apps) compatible with our system.

## 2022-04-26 NOTE — PROGRESS NOTE ADULT - ATTENDING COMMENTS
52F p/w abd pain found to have duodenitis on CT suspected to be 2/2 PUD. s/p Maalox with significant improvement. Pt currently reports resolution of abdominal pain. Continue with PPI and maalox prn. H. pylori sent out and pt to follow up outpt. Also with transaminitis, LFTs stable over the last few days. Passed stone? Neg viral hep panel and TSH on the upper limit of normal but not significantly elevated. Expect to downtrend back to baseline. CT A/P with hypodense lesion, likely fat density? Will need repeat LFTs and MRI as outpatient w/ GI f/u. Pt expressed understanding.
52F p/w abd pain found to have duodenitis on CT suspected to be 2/2 PUD. s/p Maalox with significant improvement. Pt currently reports resolution of abdominal pain. Will send out stool testing to rule-out H. pylori and start on PPI. Also with transaminitis, LFTs mostly stable. Pending viral hep panel and TSH. CT A/P with hypodense lesion, likely fat density? Outpt MRI for further eval

## 2022-04-26 NOTE — PROGRESS NOTE ADULT - SUBJECTIVE AND OBJECTIVE BOX
***incomplete PROGRESS NOTE:     Patient is a 52y old  Female who presents with a chief complaint of Abdominal pain (26 Apr 2022 07:41)      SUBJECTIVE / OVERNIGHT EVENTS:  [place]  Emily #[id number] utilized for translation services, total length of translation 9 mins 23 seconds.  No acute overnight events, patient felt slightly bloated after dinner last night but no abd pain or nausea.  Feels well this morning, also pain free.  Discussed results including negative hepatitis panel which she was thrilled about.  She is otherwise agreeable with discharge plan and follow up.      MEDICATIONS  (STANDING):  cholecalciferol 1000 Unit(s) Oral daily  dextrose 50% Injectable 25 Gram(s) IV Push once  dextrose 50% Injectable 12.5 Gram(s) IV Push once  dextrose 50% Injectable 25 Gram(s) IV Push once  dextrose Oral Gel 15 Gram(s) Oral once  enoxaparin Injectable 40 milliGRAM(s) SubCutaneous every 24 hours  glucagon  Injectable 1 milliGRAM(s) IntraMuscular once  insulin lispro (ADMELOG) corrective regimen sliding scale   SubCutaneous three times a day before meals  insulin lispro (ADMELOG) corrective regimen sliding scale   SubCutaneous at bedtime  levothyroxine 75 MICROGram(s) Oral daily  omega-3-Acid Ethyl Esters 4 Gram(s) Oral daily  pantoprazole    Tablet 40 milliGRAM(s) Oral before breakfast    MEDICATIONS  (PRN):  ALBUTerol    90 MICROgram(s) HFA Inhaler 2 Puff(s) Inhalation every 6 hours PRN Shortness of Breath and/or Wheezing  aluminum hydroxide/magnesium hydroxide/simethicone Suspension 30 milliLiter(s) Oral every 4 hours PRN Dyspepsia  melatonin 3 milliGRAM(s) Oral at bedtime PRN Insomnia  ondansetron Injectable 4 milliGRAM(s) IV Push every 8 hours PRN Nausea and/or Vomiting      CAPILLARY BLOOD GLUCOSE      POCT Blood Glucose.: 177 mg/dL (26 Apr 2022 09:06)  POCT Blood Glucose.: 212 mg/dL (25 Apr 2022 21:58)  POCT Blood Glucose.: 152 mg/dL (25 Apr 2022 17:43)  POCT Blood Glucose.: 158 mg/dL (25 Apr 2022 12:51)    I&O's Summary      PHYSICAL EXAM:  Vital Signs Last 24 Hrs  T(C): 36.4 (26 Apr 2022 06:39), Max: 37.4 (25 Apr 2022 21:07)  T(F): 97.5 (26 Apr 2022 06:39), Max: 99.4 (25 Apr 2022 21:07)  HR: 60 (26 Apr 2022 06:39) (60 - 78)  BP: 101/59 (26 Apr 2022 06:39) (101/59 - 111/70)  BP(mean): --  RR: 17 (26 Apr 2022 06:39) (17 - 18)  SpO2: 100% (26 Apr 2022 06:39) (97% - 100%)    CONSTITUTIONAL: NAD, well-developed, A&Ox3 to person, place, time.  RESPIRATORY: Normal respiratory effort; lungs are clear to auscultation bilaterally  CARDIOVASCULAR: Regular rate and rhythm, normal S1 and S2, no murmur/rub/gallop; No lower extremity edema; Peripheral pulses are 2+ bilaterally  ABDOMEN: Nontender to palpation, no rebound/guarding; No hepatosplenomegaly  MUSCLOSKELETAL: no clubbing or cyanosis of digits; no joint swelling or tenderness to palpation  NEURO: CN 2-12 grossly intact, moves all limbs spontaneously      LABS:    04-26    138  |  104  |  15  ----------------------------<  176<H>  3.8   |  23  |  0.55    Ca    8.7      26 Apr 2022 07:14  Phos  3.2     04-24  Mg     2.80     04-24    TPro  7.2  /  Alb  3.6  /  TBili  0.7  /  DBili  x   /  AST  309<H>  /  ALT  544<H>  /  AlkPhos  137<H>  04-26      Culture - Urine (collected 24 Apr 2022 09:02)  Source: Clean Catch Clean Catch (Midstream)  Final Report (25 Apr 2022 12:41):    10,000 - 49,000 CFU/mL Streptococcus agalactiae (Group B) isolated    Group B streptococci are susceptible to ampicillin,    penicillin and cefazolin, but may be resistant to    erythromycin and clindamycin.    Recommendations for intrapartum prophylaxis for Group B    streptococci are penicillin or ampicillin.        RADIOLOGY & ADDITIONAL TESTS:  Results Reviewed: y  Imaging Personally Reviewed:  Electrocardiogram Personally Reviewed:    COORDINATION OF CARE:  Care Discussed with Consultants/Other Providers [Y/N]:  Prior or Outpatient Records Reviewed [Y/N]:

## 2022-04-26 NOTE — DISCHARGE NOTE NURSING/CASE MANAGEMENT/SOCIAL WORK - NSPROEXTENSIONSOFSELF_GEN_A_NUR
As per pt, complains of B/L knee pain, uses cane at home for  support to assist  her with walking./cane

## 2022-04-26 NOTE — PROGRESS NOTE ADULT - PROBLEM SELECTOR PLAN 2
- liver enzymes stable/improving  - hepatitis panel pending - liver enzymes stable/improving  - hepatitis panel negative  - No obstructive pathology on CT or RUQ u/s

## 2022-04-26 NOTE — PROGRESS NOTE ADULT - ASSESSMENT
52F pmh T2DM, hypothyroidism, LI, asthma, osteoarthritis who presents with 1 week epigastric pain, found to have duodenitis on CT abdomen concerning for peptic ulcer disease.
52F pmh T2DM, hypothyroidism, LI, asthma, osteoarthritis who presents with 1 week epigastric pain, found to have duodenitis on CT abdomen concerning for peptic ulcer disease.

## 2022-04-27 PROBLEM — E03.9 HYPOTHYROIDISM, UNSPECIFIED: Chronic | Status: ACTIVE | Noted: 2022-04-24

## 2022-04-27 PROBLEM — M19.90 UNSPECIFIED OSTEOARTHRITIS, UNSPECIFIED SITE: Chronic | Status: ACTIVE | Noted: 2022-04-24

## 2022-04-27 PROBLEM — J45.909 UNSPECIFIED ASTHMA, UNCOMPLICATED: Chronic | Status: ACTIVE | Noted: 2022-04-24

## 2022-04-27 PROBLEM — K75.81 NONALCOHOLIC STEATOHEPATITIS (NASH): Chronic | Status: ACTIVE | Noted: 2022-04-24

## 2022-04-28 LAB — H PYLORI AG STL QL: SIGNIFICANT CHANGE UP

## 2022-05-03 ENCOUNTER — APPOINTMENT (OUTPATIENT)
Dept: ORTHOPEDIC SURGERY | Facility: CLINIC | Age: 52
End: 2022-05-03

## 2022-05-04 LAB
MELD SCORE WITH DIALYSIS: SIGNIFICANT CHANGE UP POINTS
MELD SCORE WITHOUT DIALYSIS: SIGNIFICANT CHANGE UP POINTS

## 2022-05-12 ENCOUNTER — OUTPATIENT (OUTPATIENT)
Dept: OUTPATIENT SERVICES | Facility: HOSPITAL | Age: 52
LOS: 1 days | End: 2022-05-12
Payer: COMMERCIAL

## 2022-05-12 ENCOUNTER — APPOINTMENT (OUTPATIENT)
Dept: MRI IMAGING | Facility: CLINIC | Age: 52
End: 2022-05-12
Payer: COMMERCIAL

## 2022-05-12 DIAGNOSIS — S89.91XA UNSPECIFIED INJURY OF RIGHT LOWER LEG, INITIAL ENCOUNTER: ICD-10-CM

## 2022-05-12 PROCEDURE — 73721 MRI JNT OF LWR EXTRE W/O DYE: CPT | Mod: 26,RT

## 2022-05-12 PROCEDURE — 73721 MRI JNT OF LWR EXTRE W/O DYE: CPT

## 2022-05-13 ENCOUNTER — APPOINTMENT (OUTPATIENT)
Dept: HEPATOLOGY | Facility: CLINIC | Age: 52
End: 2022-05-13
Payer: COMMERCIAL

## 2022-05-13 VITALS
RESPIRATION RATE: 12 BRPM | BODY MASS INDEX: 31.54 KG/M2 | OXYGEN SATURATION: 97 % | DIASTOLIC BLOOD PRESSURE: 70 MMHG | TEMPERATURE: 97.2 F | HEIGHT: 63 IN | WEIGHT: 178 LBS | HEART RATE: 72 BPM | SYSTOLIC BLOOD PRESSURE: 104 MMHG

## 2022-05-13 DIAGNOSIS — Z86.39 PERSONAL HISTORY OF OTHER ENDOCRINE, NUTRITIONAL AND METABOLIC DISEASE: ICD-10-CM

## 2022-05-13 PROCEDURE — 99204 OFFICE O/P NEW MOD 45 MIN: CPT

## 2022-05-13 NOTE — HISTORY OF PRESENT ILLNESS
[de-identified] : 53 y/o F w/ hx of T2DM, hypothyroidism, asthma, s/p cholecystectomy (in her 30s), hx LI here for elevated liver enzymes.\par \par She was admitted from 4/24/22 to 4/26/22 at [facility] for abdominal pain, duodenitis found to have liver enzymes in the 600 - 700s that slowly improved. US abdomen was unremarkable. CT shows ? indeterminate liver lesion and duodenitis.\par 4/24/22 BW - , , \par She was having epigastric abdominal pain for a week prior to that admission. That pain has now resolved after being starting on Pantoprazole.\par She is on Jardiance since October 2021 and Janumet since 2020. \par She had a prior liver biopsy about 2 years ago at [facility] for elevated liver enzymes.

## 2022-05-13 NOTE — PHYSICAL EXAM
[Scleral Icterus] : No Scleral Icterus [Spider Angioma] : No spider angioma(s) were observed [Abdominal  Ascites] : no ascites [Ascites Fluid Wave] : no ascites fluid wave [Ascites Tense] : ascites is not tense [Non-Tender] : non-tender [Asterixis] : no asterixis observed [Jaundice] : No jaundice [Depression] : no depression [Hallucinations] : ~T no ~M hallucinations [General Appearance - Alert] : alert [General Appearance - In No Acute Distress] : in no acute distress [Sclera] : the sclera and conjunctiva were normal [PERRL With Normal Accommodation] : pupils were equal in size, round, and reactive to light [Extraocular Movements] : extraocular movements were intact [Outer Ear] : the ears and nose were normal in appearance [Oropharynx] : the oropharynx was normal [Auscultation Breath Sounds / Voice Sounds] : lungs were clear to auscultation bilaterally [Heart Rate And Rhythm] : heart rate was normal and rhythm regular [Heart Sounds Gallop] : no gallops [Heart Sounds] : normal S1 and S2 [Murmurs] : no murmurs [Heart Sounds Pericardial Friction Rub] : no pericardial rub [Bowel Sounds] : normal bowel sounds [Abdomen Soft] : soft [Abdomen Tenderness] : non-tender [Abdomen Mass (___ Cm)] : no abdominal mass palpated [Cervical Lymph Nodes Enlarged Posterior Bilaterally] : posterior cervical [Cervical Lymph Nodes Enlarged Anterior Bilaterally] : anterior cervical [Supraclavicular Lymph Nodes Enlarged Bilaterally] : supraclavicular [Axillary Lymph Nodes Enlarged Bilaterally] : axillary [Femoral Lymph Nodes Enlarged Bilaterally] : femoral [Inguinal Lymph Nodes Enlarged Bilaterally] : inguinal [No CVA Tenderness] : no ~M costovertebral angle tenderness [No Spinal Tenderness] : no spinal tenderness [Abnormal Walk] : normal gait [Nail Clubbing] : no clubbing  or cyanosis of the fingernails [Musculoskeletal - Swelling] : no joint swelling seen [Motor Tone] : muscle strength and tone were normal [Skin Color & Pigmentation] : normal skin color and pigmentation [Skin Turgor] : normal skin turgor [] : no rash [Deep Tendon Reflexes (DTR)] : deep tendon reflexes were 2+ and symmetric [Sensation] : the sensory exam was normal to light touch and pinprick [No Focal Deficits] : no focal deficits [Oriented To Time, Place, And Person] : oriented to person, place, and time [Impaired Insight] : insight and judgment were intact [Affect] : the affect was normal

## 2022-05-13 NOTE — ASSESSMENT
[FreeTextEntry1] : 53 y/o F w/ hx of T2DM, hypothyroidism, asthma, s/p cholecystectomy (in her 30s), hx LI here for elevated liver enzymes.\par \par # Elevated liver enzymes, incidentally noticed in April 2022.\par She had a prior liver biopsy about 2 years ago at [facility] for elevated liver enzymes.\par Jardiance since October 2021 and Janumet since 2020, unlikely DILI.\par May have had a passed stone but prior elevated liver enzymes to a high enough degree to result in a liver biopsy is not promising.\par R/o AIH. r/o HEV.\par \par RTC 1 month w/ Fibroscan with labs

## 2022-05-16 LAB
ALBUMIN SERPL ELPH-MCNC: 4.2 G/DL
ALP BLD-CCNC: 116 U/L
ALT SERPL-CCNC: 218 U/L
ANA PAT FLD IF-IMP: ABNORMAL
ANA SER IF-ACNC: ABNORMAL
ANION GAP SERPL CALC-SCNC: 13 MMOL/L
AST SERPL-CCNC: 117 U/L
BASOPHILS # BLD AUTO: 0.02 K/UL
BASOPHILS NFR BLD AUTO: 0.3 %
BILIRUB SERPL-MCNC: 0.5 MG/DL
BUN SERPL-MCNC: 13 MG/DL
CALCIUM SERPL-MCNC: 9.2 MG/DL
CHLORIDE SERPL-SCNC: 104 MMOL/L
CO2 SERPL-SCNC: 24 MMOL/L
CREAT SERPL-MCNC: 0.74 MG/DL
DEPRECATED KAPPA LC FREE/LAMBDA SER: 1.11 RATIO
EGFR: 98 ML/MIN/1.73M2
EOSINOPHIL # BLD AUTO: 0.12 K/UL
EOSINOPHIL NFR BLD AUTO: 1.7 %
FERRITIN SERPL-MCNC: 439 NG/ML
GGT SERPL-CCNC: 143 U/L
GLUCOSE SERPL-MCNC: 111 MG/DL
HBV CORE IGG+IGM SER QL: NONREACTIVE
HBV SURFACE AB SER QL: REACTIVE
HBV SURFACE AG SER QL: NONREACTIVE
HCT VFR BLD CALC: 41.7 %
HCV AB SER QL: NONREACTIVE
HCV S/CO RATIO: 0.29 S/CO
HEPATITIS A IGG ANTIBODY: REACTIVE
HGB BLD-MCNC: 13.4 G/DL
IGA SER QL IEP: 356 MG/DL
IGG SER QL IEP: 1698 MG/DL
IGM SER QL IEP: 131 MG/DL
IMM GRANULOCYTES NFR BLD AUTO: 0.3 %
INR PPP: 1.06 RATIO
KAPPA LC CSF-MCNC: 2.52 MG/DL
KAPPA LC SERPL-MCNC: 2.8 MG/DL
LYMPHOCYTES # BLD AUTO: 2.01 K/UL
LYMPHOCYTES NFR BLD AUTO: 28.2 %
MAN DIFF?: NORMAL
MCHC RBC-ENTMCNC: 30.5 PG
MCHC RBC-ENTMCNC: 32.1 GM/DL
MCV RBC AUTO: 94.8 FL
MITOCHONDRIA AB SER IF-ACNC: NORMAL
MONOCYTES # BLD AUTO: 0.54 K/UL
MONOCYTES NFR BLD AUTO: 7.6 %
NEUTROPHILS # BLD AUTO: 4.42 K/UL
NEUTROPHILS NFR BLD AUTO: 61.9 %
PLATELET # BLD AUTO: 198 K/UL
POTASSIUM SERPL-SCNC: 4 MMOL/L
PROT SERPL-MCNC: 7.3 G/DL
PT BLD: 12.5 SEC
RBC # BLD: 4.4 M/UL
RBC # FLD: 13.2 %
SMOOTH MUSCLE AB SER QL IF: ABNORMAL
SODIUM SERPL-SCNC: 141 MMOL/L
WBC # FLD AUTO: 7.13 K/UL

## 2022-05-17 LAB — LKM AB SER QL IF: <20.1 UNITS

## 2022-05-19 LAB — HEV AB SER QL: NEGATIVE

## 2022-05-20 ENCOUNTER — APPOINTMENT (OUTPATIENT)
Dept: ORTHOPEDIC SURGERY | Facility: CLINIC | Age: 52
End: 2022-05-20
Payer: COMMERCIAL

## 2022-05-20 DIAGNOSIS — M17.10 UNILATERAL PRIMARY OSTEOARTHRITIS, UNSPECIFIED KNEE: ICD-10-CM

## 2022-05-20 PROCEDURE — 99214 OFFICE O/P EST MOD 30 MIN: CPT

## 2022-05-23 LAB — HEPATITIS E IGM ABY: NOT DETECTED

## 2022-05-25 ENCOUNTER — APPOINTMENT (OUTPATIENT)
Dept: ORTHOPEDIC SURGERY | Facility: CLINIC | Age: 52
End: 2022-05-25

## 2022-06-01 ENCOUNTER — APPOINTMENT (OUTPATIENT)
Dept: ORTHOPEDIC SURGERY | Facility: CLINIC | Age: 52
End: 2022-06-01
Payer: COMMERCIAL

## 2022-06-01 VITALS — WEIGHT: 178 LBS | BODY MASS INDEX: 32.76 KG/M2 | HEIGHT: 62 IN

## 2022-06-01 PROCEDURE — 99213 OFFICE O/P EST LOW 20 MIN: CPT

## 2022-06-01 NOTE — PHYSICAL EXAM
[de-identified] : Constitutional:Well nourished , well developed and in no acute distress\par Psychiatric: Alert and oriented to time place and person.Appropriate affect \par Skin:Head, neck, arms and lower extremities:no lesions or discoloration\par HEENT: Normocephalic, EOM intact, Nasal septum midline,\par Respiratory: Unlabored respirations,no audible wheezing ,no tachypnea, no cyanosis\par Cardiovascular: no leg swelling  no ankle edema no JVD, pulse regular\par Vascular: no calf or thigh tenderness, \par Peripheral pulses; intact\par Lymphatics:No groin adenopathy,no lymphedema lower  or upper extremities\par Right knee flexion deformity no effusion flexion 10/120 moderate to marked focal tenderness medial joint line ligaments intact peripheral pulses intact [de-identified] : X-rays right knee multiple views March 2022 reveal slight medial compartment narrowing multiple loose bodies 9 mm loose body anterior intercondylar notch\par MRI right knee of May 12, 2022 reveals advanced lateral patellofemoral compartment osteoarthritis loose body

## 2022-06-01 NOTE — HISTORY OF PRESENT ILLNESS
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  3 years ago patient began experience spontaneous onset of chronic intermittent pain over the medial and anterior aspect of the right knee.  Severity has been increasing.  Pain experienced now with walking and going from sitting to standing.  She is recently undergone intra-articular injection without symptom improvement.

## 2022-06-01 NOTE — DISCUSSION/SUMMARY
[de-identified] : Impression osteoarthritis right knee unresponsive to medical management to date and compromising quality of life.\par Recommendation I discussed with patient and accompanying  recommendation for total knee replacement.  Given patient's young age I have explained that she would be a good candidate for cementless total knee rather than cemented total knee which is my usual technique.  As such I have recommended my associate Dr. Guerra for MAKOplasty robot assisted cementless total knee replacement

## 2022-06-03 ENCOUNTER — OUTPATIENT (OUTPATIENT)
Dept: OUTPATIENT SERVICES | Facility: HOSPITAL | Age: 52
LOS: 1 days | End: 2022-06-03
Payer: COMMERCIAL

## 2022-06-03 ENCOUNTER — APPOINTMENT (OUTPATIENT)
Dept: CT IMAGING | Facility: CLINIC | Age: 52
End: 2022-06-03
Payer: COMMERCIAL

## 2022-06-03 DIAGNOSIS — M17.11 UNILATERAL PRIMARY OSTEOARTHRITIS, RIGHT KNEE: ICD-10-CM

## 2022-06-03 DIAGNOSIS — S89.91XA UNSPECIFIED INJURY OF RIGHT LOWER LEG, INITIAL ENCOUNTER: ICD-10-CM

## 2022-06-03 PROCEDURE — 73700 CT LOWER EXTREMITY W/O DYE: CPT | Mod: 26,RT

## 2022-06-03 PROCEDURE — 73700 CT LOWER EXTREMITY W/O DYE: CPT

## 2022-06-08 ENCOUNTER — APPOINTMENT (OUTPATIENT)
Dept: ORTHOPEDIC SURGERY | Facility: CLINIC | Age: 52
End: 2022-06-08
Payer: COMMERCIAL

## 2022-06-08 VITALS — HEIGHT: 62 IN | BODY MASS INDEX: 32.76 KG/M2 | WEIGHT: 178 LBS

## 2022-06-08 PROCEDURE — 73562 X-RAY EXAM OF KNEE 3: CPT | Mod: RT

## 2022-06-08 PROCEDURE — 99214 OFFICE O/P EST MOD 30 MIN: CPT

## 2022-06-09 NOTE — DISCUSSION/SUMMARY
[de-identified] : 52 year old female with mild to moderate osteoarthritis of the right knee. Discussed with patient non-operative and operative treatment. Patient will start with conservative treatment and treatment options were discussed including NSAIDs, ice, physical therapy, corticosteroid injections, viscosupplementation. We will seek authorization for right knee gel injections. Once we receive authorization, we will proceed accordingly. I recommended a course of physical therapy for the right knee to improve ROM and strength. A script was provided today. FU in 6 weeks.

## 2022-06-09 NOTE — PHYSICAL EXAM
[Normal RLE] : Right Lower Extremity: No scars, rashes, lesions, ulcers, skin intact [Normal LLE] : Left Lower Extremity: No scars, rashes, lesions, ulcers, skin intact [Normal Touch] : sensation intact for touch [Normal] : no peripheral adenopathy appreciated [de-identified] : Patient appears stated age in no acute respiratory distress. Patient is alert oriented x3. Patient has normal mood and affect. \par \par Right Knee Exam\par There is minimal to moderate effusion. Range of motion is 0-120°. Painful at the extremes of range of motion. \par There is medial and lateral joint line tenderness. \par Quadriceps strength is 4/5. There is some muscle loss in the quadriceps. \par Anteroposterior and mediolateral stability is intact Della test is negative. Alignment of the knee is in 10 degree varus \par \par Left knee exam\par Range of motion of the knee is 0-120°. \par Skin is normal. No rash.\par There is no effusion. No medial or lateral joint line tenderness. No swelling, no pitting edema.\par Overall alignment of the knee is then slight varus. Good anterior posterior stability. Firm endpoints on anterior and posterior drawer. \par Medial lateral stability is intact. Firm endpoint on medial and lateral stress testing .\par Della test is negative.\par Quadriceps strength 5/ 5. There is no loss of muscle volume in the thigh. \par Good anterior posterior and mediolateral stability.\par Sensation in the extremities intact. \par Discrimination is intact. Good DP and PT pulses.\par 		\par Bilateral hip exam\par On inspection of the hip shows skin is normal. No evidence of rash. \par No loss of muscle. Abductor strength is 5 out of 5. Hip flexor strength is 5.\par Range of motion of the hip at 90° flexion internal rotation is 15° external rotation is 30° pain-free. \par Hip has good stability in anterior and posterior direction. \par On lateral decubitus examination there is no tenderness in the greater trochanter. \par \par Lower Extremity Examination \par Bilateral lower extremity skin is normal. There is no rash. There is no edema and lymphadenopathy. DP and PT pulses intact. Sensation is intact.  [de-identified] : X-rays of the right knee 3 views obtained today 06/08/2022 shows mild to moderate osteoarthritis.

## 2022-06-09 NOTE — HISTORY OF PRESENT ILLNESS
[de-identified] : Patient is a 52 year-old female who presents to the office today for initial evaluation of right knee pain. She is currently unemployed. She has been seeing Dr. Almonte for this issue and he referred her to our practice today for total knee arthroplasty consultation. She has had right knee pain for the past 5 years. The pain is constant in nature. Currently, she has pain that is 10/10 in intensity. It is worse with standing still. Pain is better when walking. She had a cortisone injection performed about 4 months ago with Dr. Brambila and she had no improvement. She presents today for possible surgical conversation.\par

## 2022-06-09 NOTE — ADDENDUM
[FreeTextEntry1] : I, Lg Valentin, acted solely as a scribe for Dr. Arvind Connors MD on this date 06/08/2022  .\par  \par All medical record entries made by the Scribe were at my, Dr. Arvind Connors MD , direction and personally dictated by me on 06/08/2022 . I have reviewed the chart and agree that the record accurately reflects my personal performance of the history, physical exam, assessment and plan. I have also personally directed, reviewed, and agreed with the chart.

## 2022-06-14 ENCOUNTER — APPOINTMENT (OUTPATIENT)
Dept: ORTHOPEDIC SURGERY | Facility: CLINIC | Age: 52
End: 2022-06-14

## 2022-06-29 ENCOUNTER — APPOINTMENT (OUTPATIENT)
Dept: ORTHOPEDIC SURGERY | Facility: HOSPITAL | Age: 52
End: 2022-06-29

## 2022-07-25 ENCOUNTER — APPOINTMENT (OUTPATIENT)
Dept: ORTHOPEDIC SURGERY | Facility: CLINIC | Age: 52
End: 2022-07-25

## 2022-07-25 ENCOUNTER — APPOINTMENT (OUTPATIENT)
Dept: HEPATOLOGY | Facility: CLINIC | Age: 52
End: 2022-07-25

## 2022-07-25 VITALS
DIASTOLIC BLOOD PRESSURE: 78 MMHG | TEMPERATURE: 97.6 F | SYSTOLIC BLOOD PRESSURE: 119 MMHG | WEIGHT: 175 LBS | HEIGHT: 62 IN | RESPIRATION RATE: 12 BRPM | OXYGEN SATURATION: 99 % | BODY MASS INDEX: 32.2 KG/M2 | HEART RATE: 74 BPM

## 2022-07-25 PROCEDURE — 99214 OFFICE O/P EST MOD 30 MIN: CPT

## 2022-07-25 PROCEDURE — 91200 LIVER ELASTOGRAPHY: CPT

## 2022-07-25 NOTE — HISTORY OF PRESENT ILLNESS
[de-identified] : 53 y/o F w/ hx of T2DM, hypothyroidism, asthma, s/p cholecystectomy (in her 30s), hx LI here for elevated liver enzymes.\par \par She was admitted from 4/24/22 to 4/26/22 at [facility] for abdominal pain, duodenitis found to have liver enzymes in the 600 - 700s that slowly improved. US abdomen was unremarkable. CT shows ? indeterminate liver lesion and duodenitis.\par 4/24/22 BW - , , \par She was having epigastric abdominal pain for a week prior to that admission. That pain has now resolved after being starting on Pantoprazole.\par She is on Jardiance since October 2021 and Janumet since 2020. \par She had a prior liver biopsy about 2 years ago at [facility] for elevated liver enzymes.\par \par 6/18/22 BW - AST 49, ALT 70\par \par 7/25/22 visit - denies abdominal pain. she has been able to lose about 3 pounds since early June due to exercise/diet changes. Fibroscan 7/25/22 , 14.8 kPa c/w S3, F3-F4.

## 2022-07-25 NOTE — PHYSICAL EXAM
[Non-Tender] : non-tender [General Appearance - Alert] : alert [General Appearance - In No Acute Distress] : in no acute distress [Sclera] : the sclera and conjunctiva were normal [PERRL With Normal Accommodation] : pupils were equal in size, round, and reactive to light [Extraocular Movements] : extraocular movements were intact [Outer Ear] : the ears and nose were normal in appearance [Oropharynx] : the oropharynx was normal [Auscultation Breath Sounds / Voice Sounds] : lungs were clear to auscultation bilaterally [Heart Rate And Rhythm] : heart rate was normal and rhythm regular [Heart Sounds] : normal S1 and S2 [Heart Sounds Gallop] : no gallops [Murmurs] : no murmurs [Heart Sounds Pericardial Friction Rub] : no pericardial rub [Bowel Sounds] : normal bowel sounds [Abdomen Soft] : soft [Abdomen Tenderness] : non-tender [Abdomen Mass (___ Cm)] : no abdominal mass palpated [Cervical Lymph Nodes Enlarged Posterior Bilaterally] : posterior cervical [Cervical Lymph Nodes Enlarged Anterior Bilaterally] : anterior cervical [Supraclavicular Lymph Nodes Enlarged Bilaterally] : supraclavicular [Axillary Lymph Nodes Enlarged Bilaterally] : axillary [Femoral Lymph Nodes Enlarged Bilaterally] : femoral [Inguinal Lymph Nodes Enlarged Bilaterally] : inguinal [No CVA Tenderness] : no ~M costovertebral angle tenderness [No Spinal Tenderness] : no spinal tenderness [Abnormal Walk] : normal gait [Nail Clubbing] : no clubbing  or cyanosis of the fingernails [Musculoskeletal - Swelling] : no joint swelling seen [Motor Tone] : muscle strength and tone were normal [Skin Color & Pigmentation] : normal skin color and pigmentation [Skin Turgor] : normal skin turgor [] : no rash [Deep Tendon Reflexes (DTR)] : deep tendon reflexes were 2+ and symmetric [Sensation] : the sensory exam was normal to light touch and pinprick [No Focal Deficits] : no focal deficits [Oriented To Time, Place, And Person] : oriented to person, place, and time [Impaired Insight] : insight and judgment were intact [Affect] : the affect was normal [Scleral Icterus] : No Scleral Icterus [Spider Angioma] : No spider angioma(s) were observed [Abdominal  Ascites] : no ascites [Ascites Fluid Wave] : no ascites fluid wave [Ascites Tense] : ascites is not tense [Asterixis] : no asterixis observed [Jaundice] : No jaundice [Depression] : no depression [Hallucinations] : ~T no ~M hallucinations

## 2022-07-25 NOTE — ASSESSMENT
[FreeTextEntry1] : 53 y/o F w/ hx of T2DM, hypothyroidism, asthma, s/p cholecystectomy (in her 30s), hx LI here for elevated liver enzymes.\par \par # Elevated liver enzymes, incidentally noticed in April 2022.\par She had a prior liver biopsy about 2 years ago at [facility] for elevated liver enzymes.\par Jardiance since October 2021 and Janumet since 2020, unlikely DILI.\par Suspect that she had a passed stone but prior elevated liver enzymes to a high enough degree to result in a liver biopsy is odd. It is downtrending however. \par She does have positive autoimmune markers but low titers (KYREE, ASMA). Probably of insignificance.\par \par # LI\par Fibroscan 7/25/22 , 14.8 kPa c/w S3, F3-F4 concerning for severe steatosis and significant fibrosis due to presumed LI. Will order MR elastography to help delineate.\par I have prescribed her Wegovy 0.25 mg sc weekly to aid in weight loss. She is to stop the Janumet after starting it.\par \par # Indeterminate liver lesion\par seen on CT 4/2022, likely fatty sparing\par will arrange MR for evaluation\par \par MR elastography ordered\par repeat labs ordered\par \par I have advised the patient on maintaining a healthy lifestyle which includes keeping a healthy diet (Mediterranean diet is preferred), calorie reduction of 30%, and regular exercise of at least 150 minutes a week to improve Her fatty liver disease. I have also advised Ms. MONTANEZ on avoidance of hepatotoxic agents and alcohol.\par \par RTC 3 months

## 2022-07-26 ENCOUNTER — NON-APPOINTMENT (OUTPATIENT)
Age: 52
End: 2022-07-26

## 2022-07-26 LAB
ALBUMIN SERPL ELPH-MCNC: 4.2 G/DL
ALP BLD-CCNC: 94 U/L
ALT SERPL-CCNC: 38 U/L
ANION GAP SERPL CALC-SCNC: 11 MMOL/L
AST SERPL-CCNC: 31 U/L
BASOPHILS # BLD AUTO: 0.02 K/UL
BASOPHILS NFR BLD AUTO: 0.2 %
BILIRUB SERPL-MCNC: 0.5 MG/DL
BUN SERPL-MCNC: 25 MG/DL
CALCIUM SERPL-MCNC: 9.4 MG/DL
CHLORIDE SERPL-SCNC: 105 MMOL/L
CO2 SERPL-SCNC: 24 MMOL/L
CREAT SERPL-MCNC: 0.7 MG/DL
EGFR: 105 ML/MIN/1.73M2
EOSINOPHIL # BLD AUTO: 0.09 K/UL
EOSINOPHIL NFR BLD AUTO: 1 %
ESTIMATED AVERAGE GLUCOSE: 148 MG/DL
GGT SERPL-CCNC: 32 U/L
GLUCOSE SERPL-MCNC: 131 MG/DL
HBA1C MFR BLD HPLC: 6.8 %
HCT VFR BLD CALC: 43.2 %
HGB BLD-MCNC: 14.3 G/DL
IMM GRANULOCYTES NFR BLD AUTO: 0.1 %
INR PPP: 1.01 RATIO
LYMPHOCYTES # BLD AUTO: 3 K/UL
LYMPHOCYTES NFR BLD AUTO: 34.8 %
MAN DIFF?: NORMAL
MCHC RBC-ENTMCNC: 29.9 PG
MCHC RBC-ENTMCNC: 33.1 GM/DL
MCV RBC AUTO: 90.4 FL
MONOCYTES # BLD AUTO: 0.67 K/UL
MONOCYTES NFR BLD AUTO: 7.8 %
NEUTROPHILS # BLD AUTO: 4.82 K/UL
NEUTROPHILS NFR BLD AUTO: 56.1 %
PLATELET # BLD AUTO: 240 K/UL
POTASSIUM SERPL-SCNC: 4.6 MMOL/L
PROT SERPL-MCNC: 7.7 G/DL
PT BLD: 11.7 SEC
RBC # BLD: 4.78 M/UL
RBC # FLD: 12.2 %
SODIUM SERPL-SCNC: 140 MMOL/L
WBC # FLD AUTO: 8.61 K/UL

## 2022-07-26 RX ORDER — SEMAGLUTIDE 0.25 MG/.5ML
0.25 INJECTION, SOLUTION SUBCUTANEOUS
Qty: 4 | Refills: 3 | Status: DISCONTINUED | COMMUNITY
Start: 2022-07-25 | End: 2022-07-26

## 2022-08-11 ENCOUNTER — OUTPATIENT (OUTPATIENT)
Dept: OUTPATIENT SERVICES | Facility: HOSPITAL | Age: 52
LOS: 1 days | End: 2022-08-11
Payer: COMMERCIAL

## 2022-08-11 ENCOUNTER — RESULT REVIEW (OUTPATIENT)
Age: 52
End: 2022-08-11

## 2022-08-11 ENCOUNTER — APPOINTMENT (OUTPATIENT)
Dept: MRI IMAGING | Facility: CLINIC | Age: 52
End: 2022-08-11

## 2022-08-11 DIAGNOSIS — K75.81 NONALCOHOLIC STEATOHEPATITIS (NASH): ICD-10-CM

## 2022-08-11 PROCEDURE — 74183 MRI ABD W/O CNTR FLWD CNTR: CPT

## 2022-08-11 PROCEDURE — 76391 MR ELASTOGRAPHY: CPT

## 2022-08-11 PROCEDURE — 76391 MR ELASTOGRAPHY: CPT | Mod: 26

## 2022-08-11 PROCEDURE — 74183 MRI ABD W/O CNTR FLWD CNTR: CPT | Mod: 26

## 2022-08-11 PROCEDURE — A9585: CPT

## 2022-09-15 ENCOUNTER — APPOINTMENT (OUTPATIENT)
Dept: ORTHOPEDIC SURGERY | Facility: CLINIC | Age: 52
End: 2022-09-15

## 2022-09-15 VITALS — WEIGHT: 175 LBS | BODY MASS INDEX: 32.2 KG/M2 | HEIGHT: 62 IN

## 2022-09-15 DIAGNOSIS — M17.11 UNILATERAL PRIMARY OSTEOARTHRITIS, RIGHT KNEE: ICD-10-CM

## 2022-09-15 PROCEDURE — 99214 OFFICE O/P EST MOD 30 MIN: CPT | Mod: 25

## 2022-09-15 PROCEDURE — 20610 DRAIN/INJ JOINT/BURSA W/O US: CPT

## 2022-09-16 PROBLEM — M17.11 PRIMARY OSTEOARTHRITIS OF RIGHT KNEE: Status: ACTIVE | Noted: 2022-06-01

## 2022-09-28 ENCOUNTER — NON-APPOINTMENT (OUTPATIENT)
Age: 52
End: 2022-09-28

## 2022-10-24 ENCOUNTER — NON-APPOINTMENT (OUTPATIENT)
Age: 52
End: 2022-10-24

## 2022-12-27 ENCOUNTER — APPOINTMENT (OUTPATIENT)
Dept: HEPATOLOGY | Facility: CLINIC | Age: 52
End: 2022-12-27

## 2022-12-27 VITALS
OXYGEN SATURATION: 97 % | HEART RATE: 62 BPM | DIASTOLIC BLOOD PRESSURE: 61 MMHG | WEIGHT: 156 LBS | TEMPERATURE: 97.8 F | RESPIRATION RATE: 12 BRPM | SYSTOLIC BLOOD PRESSURE: 94 MMHG | HEIGHT: 62 IN | BODY MASS INDEX: 28.71 KG/M2

## 2022-12-27 PROCEDURE — 99214 OFFICE O/P EST MOD 30 MIN: CPT

## 2022-12-27 NOTE — ASSESSMENT
[FreeTextEntry1] : 53 y/o F w/ hx of T2DM, hypothyroidism, asthma, s/p cholecystectomy (in her 30s), hx LI here for elevated liver enzymes, former pt for Dr. Farfan, now establishing care with me after his departure. Imaging and labs reviewed. Following issues were d/w pt and  in detail\par \par Patient is aware that they have several components of the metabolic syndrome including weight gain during adulthood. The benefit of a low glycemic diet and exercise were discussed. The patient needs improved treatment of diabetes, HTN, elevated cholesterol as appropriate. Long term sequelae of Fatty liver disease including progression to decompensated cirrhosis and hcc explained to pt.  The utility of nutrition consult explained. The role of liver biopsy also discussed.  Patient demonstrated understanding.\par \par \par \par \par

## 2022-12-27 NOTE — PHYSICAL EXAM
[General Appearance - Alert] : alert [Sclera] : the sclera and conjunctiva were normal [Respiration, Rhythm And Depth] : normal respiratory rhythm and effort [Auscultation Breath Sounds / Voice Sounds] : lungs were clear to auscultation bilaterally [Heart Rate And Rhythm] : heart rate was normal and rhythm regular [Bowel Sounds] : normal bowel sounds [Abdomen Soft] : soft [Abdomen Tenderness] : non-tender [Oriented To Time, Place, And Person] : oriented to person, place, and time

## 2022-12-27 NOTE — HISTORY OF PRESENT ILLNESS
[de-identified] : \par 51 y/o F w/ hx of T2DM, hypothyroidism, asthma, s/p cholecystectomy (in her 30s), hx LI here for elevated liver enzymes, former pt for Dr. Farfan, now establishing care with me after his departure. Accompanied by ; no sx; no etoh; meds reviewed; jenny Ozempic o.5mg under guidance of PharmTAMMI Aleman. Walks daily, eating well\par \par wt from 175lbs to 156lbs today\par \par MEDS: vit d, pantoprazole, jardiance, ozempic, levothyroxine\par \par Previous Dr. Farfan’s note:\par \par She was admitted from 4/24/22 to 4/26/22 at [facility] for abdominal pain, duodenitis found to have liver enzymes in the 600 - 700s that slowly improved. US abdomen was unremarkable. CT shows ? indeterminate liver lesion and duodenitis.\par 4/24/22 BW - , , \par She was having epigastric abdominal pain for a week prior to that admission. That pain has now resolved after being starting on Pantoprazole.\par She is on Jardiance since October 2021 and Janumet since 2020. \par She had a prior liver biopsy about 2 years ago at [facility] for elevated liver enzymes.\par \par 6/18/22 BW - AST 49, ALT 70\par \par 7/25/22 visit - denies abdominal pain. she has been able to lose about 3 pounds since early June due to exercise/diet changes. Fibroscan 7/25/22 , 14.8 kPa c/w S3, F3-F4. MRE F1-F2\par \par MRE Aug’22: hepatic fat fraction nl; iron none, stiffness F1-2\par

## 2022-12-28 LAB
ALBUMIN SERPL ELPH-MCNC: 3.9 G/DL
ALP BLD-CCNC: 97 U/L
ALT SERPL-CCNC: 18 U/L
ANION GAP SERPL CALC-SCNC: 10 MMOL/L
AST SERPL-CCNC: 19 U/L
BASOPHILS # BLD AUTO: 0.02 K/UL
BASOPHILS NFR BLD AUTO: 0.3 %
BILIRUB SERPL-MCNC: 0.3 MG/DL
BUN SERPL-MCNC: 18 MG/DL
CALCIUM SERPL-MCNC: 9.1 MG/DL
CHLORIDE SERPL-SCNC: 104 MMOL/L
CO2 SERPL-SCNC: 26 MMOL/L
CREAT SERPL-MCNC: 0.69 MG/DL
EGFR: 105 ML/MIN/1.73M2
EOSINOPHIL # BLD AUTO: 0.08 K/UL
EOSINOPHIL NFR BLD AUTO: 1.2 %
ESTIMATED AVERAGE GLUCOSE: 131 MG/DL
GLUCOSE SERPL-MCNC: 106 MG/DL
HBA1C MFR BLD HPLC: 6.2 %
HCT VFR BLD CALC: 40.5 %
HGB BLD-MCNC: 13.3 G/DL
IMM GRANULOCYTES NFR BLD AUTO: 0.1 %
INR PPP: 1.07 RATIO
LYMPHOCYTES # BLD AUTO: 2 K/UL
LYMPHOCYTES NFR BLD AUTO: 29.2 %
MAN DIFF?: NORMAL
MCHC RBC-ENTMCNC: 31.1 PG
MCHC RBC-ENTMCNC: 32.8 GM/DL
MCV RBC AUTO: 94.8 FL
MONOCYTES # BLD AUTO: 0.66 K/UL
MONOCYTES NFR BLD AUTO: 9.6 %
NEUTROPHILS # BLD AUTO: 4.07 K/UL
NEUTROPHILS NFR BLD AUTO: 59.6 %
PLATELET # BLD AUTO: 289 K/UL
POTASSIUM SERPL-SCNC: 4.5 MMOL/L
PROT SERPL-MCNC: 7.1 G/DL
PT BLD: 12.6 SEC
RBC # BLD: 4.27 M/UL
RBC # FLD: 12.7 %
SODIUM SERPL-SCNC: 140 MMOL/L
WBC # FLD AUTO: 6.84 K/UL

## 2022-12-30 ENCOUNTER — NON-APPOINTMENT (OUTPATIENT)
Age: 52
End: 2022-12-30

## 2022-12-30 ENCOUNTER — APPOINTMENT (OUTPATIENT)
Dept: TRANSPLANT | Facility: CLINIC | Age: 52
End: 2022-12-30

## 2022-12-30 ENCOUNTER — OUTPATIENT (OUTPATIENT)
Dept: OUTPATIENT SERVICES | Facility: HOSPITAL | Age: 52
LOS: 1 days | End: 2022-12-30

## 2023-01-24 ENCOUNTER — NON-APPOINTMENT (OUTPATIENT)
Age: 53
End: 2023-01-24

## 2023-03-06 ENCOUNTER — RX RENEWAL (OUTPATIENT)
Age: 53
End: 2023-03-06

## 2023-03-08 NOTE — ED PROVIDER NOTE - OBJECTIVE STATEMENT
50 y/o F w/ PMHx DM presenting to the ED w/ weeks to months of gradually worsening R knee pain. States she is able to ambulate w/ difficulty. Reports she fell as a result of the pain. Denies fever. LIZZIE Degroot

## 2023-05-25 ENCOUNTER — NON-APPOINTMENT (OUTPATIENT)
Age: 53
End: 2023-05-25

## 2023-06-27 ENCOUNTER — APPOINTMENT (OUTPATIENT)
Dept: HEPATOLOGY | Facility: CLINIC | Age: 53
End: 2023-06-27
Payer: COMMERCIAL

## 2023-06-27 VITALS
SYSTOLIC BLOOD PRESSURE: 97 MMHG | TEMPERATURE: 97.5 F | HEIGHT: 62 IN | HEART RATE: 79 BPM | DIASTOLIC BLOOD PRESSURE: 62 MMHG | OXYGEN SATURATION: 100 % | RESPIRATION RATE: 14 BRPM | WEIGHT: 150 LBS | BODY MASS INDEX: 27.6 KG/M2

## 2023-06-27 PROCEDURE — 99215 OFFICE O/P EST HI 40 MIN: CPT

## 2023-06-27 RX ORDER — PANTOPRAZOLE 40 MG/1
40 TABLET, DELAYED RELEASE ORAL
Qty: 90 | Refills: 2 | Status: DISCONTINUED | COMMUNITY
Start: 2022-07-25 | End: 2023-06-27

## 2023-06-27 RX ORDER — LEVOTHYROXINE SODIUM 0.17 MG/1
TABLET ORAL
Refills: 0 | Status: ACTIVE | COMMUNITY

## 2023-06-27 RX ORDER — HYALURONATE SODIUM, STABILIZED 88 MG/4 ML
88 SYRINGE (ML) INTRAARTICULAR
Qty: 1 | Refills: 0 | Status: DISCONTINUED | COMMUNITY
Start: 2022-06-08 | End: 2023-06-27

## 2023-06-27 NOTE — PLAN
[FreeTextEntry1] : \par #NAFLD\par - Patient is aware that they have several components of the metabolic syndrome including weight gain during adulthood. The benefit of a low glycemic diet and exercise were discussed. The patient needs improved treatment of diabetes, HTN, and HLD as appropriate. Long term sequelae of fatty liver disease including progression to decompensated cirrhosis and HCC explained to patient. The utility of nutrition consult explained. The role of liver biopsy also discussed. \par - 8/2022 MRE: hepatic fat fraction nl; iron none, stiffness F1-2\par - Obtain US Abd to f/u on liver morphology and presence of steatosis\par - Update fibroscan to f/u on degree of steatosis and fibrosis with 10% reduction in weight; if indeterminate will f/u with MRE\par - Increase Ozempic to 1 mg SQ weekly to aid with continued weight loss and for goal BMI < 25\par - Encouraged to start exercise regimen after clearance by PCP to aid with weight loss and reduce CV risk\par - Pt advised to d/c Phentermine \par - Start Miralax 17 g PO Daily to aid with constipation\par - Counseled on importance of judicious use of OTC meds/supps/herbals\par - Safe ETOH consumption reviewed and encouraged\par - Maintain f/u with providers for adequate management and optimization of metabolic profile\par \par Update labs today. \par RTC in 6 months. \par Plan discussed with Dr. Vaughn. \par \par Jane Tejada, MSN, AGACNP-BC\par Transplant Hepatology Nurse Practitioner\par [facility]\par [address] [place] [id number]\par T: [phone] | F: [phone]

## 2023-06-27 NOTE — HISTORY OF PRESENT ILLNESS
[TextBox_42] :  Services: Declined\par Transplant Hepatologist: Dr. Marine Vaughn\[facility] Transplant Hepatology NP: Jane Tejada\par \par Nancy Gtz is a 52 y/o female with a PMH of Hypothyroidism, Asthma, Obesity, T2DM, Abnormal liver tests 2/2 LI, and fatty liver, here for follow-up. \par \par Patient established care with us as Dr. Farfan is no longer with the practice. Pt with hx of LI and elevated LFTs after being admitted at [facility] from 4/24-4/26/22 2/2 abdominal pain -- ultimately found to have duodenitis and transaminitis (LFTs 600 - 700's) that self resolved and duodenitis tx w/ Pantoprazole. Patient also with long standing hx of T2DM, initially maintained on Janumet and Jardiance, however, has since been switched to Ozempic + Jardiance. No episodes of liver decompensation. Prior liver bx at [facility] 2/2 abnormal liver tests (path not available). Long standing hx of obesity with heaviest weight ~ 175 lbs. Social/rare ETOH use. No IVDU. No blood transfusions. No supps/herbals. Independent in ADLs. \par  \par - Fibroscan 7/25/22 , 14.8 kPa c/w S3, F3-F4. MRE F1-F2\par - 8/2022 MRE: hepatic fat fraction nl; iron none, stiffness F1-2\par \par In the interim since last visit, there have been no interim illnesses or hospitalizations. Patient has continued to lose weight with diet modifications and increasing physical activity while maintained on Ozempic 0.5 mg SQ Weekly -- weight has gone from 175 lbs > 156 lbs > 150 lbs since being started on Ozempic and has been well tolerated. She has since also been started on Phentermine to aid with weight loss. Patient's allergies, medications, past medical, surgical, family, and social histories were reviewed and updated as appropriate. Seen in clinic today, reports that she feels well and only complaint is of intermittent bloating 2/2 constipation. Denies any recent fevers, chills, cough, lightheadedness, AMS, abdominal pain, n/v, diarrhea, hematochezia, hematemesis, and melena. Denies tobacco and recreational drug use. +Rare/social ETOH use. No supps/herbals.

## 2023-06-27 NOTE — REVIEW OF SYSTEMS
[Constipation] : constipation [Fever] : no fever [Chills] : no chills [Fatigue] : no fatigue [Chest Pain] : no chest pain [Palpitations] : no palpitations [SOB] : no shortness of breath [Cough] : no cough [Abdominal Pain] : no abdominal pain [Nausea] : no nausea [Diarrhea] : diarrhea [Vomiting] : no vomiting [Dysuria] : no dysuria [Hematuria] : no hematuria [Itching] : no itching [Headache] : no headache [Dizziness] : no dizziness

## 2023-06-27 NOTE — PHYSICAL EXAM
[Alert] : alert [Healthy Appearing] : healthy appearing [Supple] : supple [Clear to Auscultation] : lungs were clear to auscultation bilaterally [Breathing Comfortably on room air] : breathing comfortably on room air [Normal Rate] : normal rate [Regular Rhythm] : regular rhythm [Soft] : soft [Normal Bowel Sounds] : normal bowel sounds [No Edema] : no edema [Scleral Icterus] : no scleral icterus

## 2023-06-27 NOTE — ASSESSMENT
[FreeTextEntry1] : Nancy Gtz is a 54 y/o female with a PMH of LI and fatty liver, here for follow-up.

## 2023-06-28 ENCOUNTER — NON-APPOINTMENT (OUTPATIENT)
Age: 53
End: 2023-06-28

## 2023-06-28 LAB
ALBUMIN SERPL ELPH-MCNC: 4.2 G/DL
ALP BLD-CCNC: 96 U/L
ALT SERPL-CCNC: 14 U/L
ANION GAP SERPL CALC-SCNC: 10 MMOL/L
AST SERPL-CCNC: 15 U/L
BILIRUB SERPL-MCNC: 0.2 MG/DL
BUN SERPL-MCNC: 23 MG/DL
CALCIUM SERPL-MCNC: 9.6 MG/DL
CHLORIDE SERPL-SCNC: 107 MMOL/L
CHOLEST SERPL-MCNC: 145 MG/DL
CO2 SERPL-SCNC: 26 MMOL/L
CREAT SERPL-MCNC: 0.7 MG/DL
EGFR: 104 ML/MIN/1.73M2
ESTIMATED AVERAGE GLUCOSE: 123 MG/DL
GLUCOSE SERPL-MCNC: 108 MG/DL
HBA1C MFR BLD HPLC: 5.9 %
HDLC SERPL-MCNC: 51 MG/DL
INR PPP: 1.07 RATIO
LDLC SERPL CALC-MCNC: 68 MG/DL
NONHDLC SERPL-MCNC: 94 MG/DL
POTASSIUM SERPL-SCNC: 5 MMOL/L
PROT SERPL-MCNC: 7 G/DL
PT BLD: 12.4 SEC
SODIUM SERPL-SCNC: 143 MMOL/L
TRIGL SERPL-MCNC: 130 MG/DL

## 2023-07-17 ENCOUNTER — APPOINTMENT (OUTPATIENT)
Dept: HEPATOLOGY | Facility: CLINIC | Age: 53
End: 2023-07-17
Payer: COMMERCIAL

## 2023-07-17 PROCEDURE — 76981 USE PARENCHYMA: CPT

## 2023-08-07 ENCOUNTER — APPOINTMENT (OUTPATIENT)
Dept: ULTRASOUND IMAGING | Facility: CLINIC | Age: 53
End: 2023-08-07
Payer: COMMERCIAL

## 2023-08-07 ENCOUNTER — OUTPATIENT (OUTPATIENT)
Dept: OUTPATIENT SERVICES | Facility: HOSPITAL | Age: 53
LOS: 1 days | End: 2023-08-07
Payer: COMMERCIAL

## 2023-08-07 DIAGNOSIS — K76.0 FATTY (CHANGE OF) LIVER, NOT ELSEWHERE CLASSIFIED: ICD-10-CM

## 2023-08-07 PROCEDURE — 93975 VASCULAR STUDY: CPT

## 2023-08-07 PROCEDURE — 93975 VASCULAR STUDY: CPT | Mod: 26

## 2023-08-18 ENCOUNTER — NON-APPOINTMENT (OUTPATIENT)
Age: 53
End: 2023-08-18

## 2023-08-18 DIAGNOSIS — K76.0 FATTY (CHANGE OF) LIVER, NOT ELSEWHERE CLASSIFIED: ICD-10-CM

## 2023-08-21 NOTE — PATIENT PROFILE ADULT - NSPROEXTENSIONSOFSELF_GEN_A_NUR
Event Note As per pt, complains of B/L knee pain, uses cane at home for  support to assist  her with walking./cane

## 2023-09-21 NOTE — PATIENT PROFILE ADULT - FUNCTIONAL SCREEN CURRENT LEVEL: SWALLOWING (IF SCORE 2 OR MORE FOR ANY ITEM, CONSULT REHAB SERVICES), MLM)
0 = swallows foods/liquids without difficulty Olanzapine Counseling- I discussed with the patient the common side effects of olanzapine including but are not limited to: lack of energy, dry mouth, increased appetite, sleepiness, tremor, constipation, dizziness, changes in behavior, or restlessness.  Explained that teenagers are more likely to experience headaches, abdominal pain, pain in the arms or legs, tiredness, and sleepiness.  Serious side effects include but are not limited: increased risk of death in elderly patients who are confused, have memory loss, or dementia-related psychosis; hyperglycemia; increased cholesterol and triglycerides; and weight gain.

## 2023-09-26 ENCOUNTER — APPOINTMENT (OUTPATIENT)
Dept: MRI IMAGING | Facility: IMAGING CENTER | Age: 53
End: 2023-09-26
Payer: COMMERCIAL

## 2023-09-26 ENCOUNTER — RESULT REVIEW (OUTPATIENT)
Age: 53
End: 2023-09-26

## 2023-09-26 ENCOUNTER — OUTPATIENT (OUTPATIENT)
Dept: OUTPATIENT SERVICES | Facility: HOSPITAL | Age: 53
LOS: 1 days | End: 2023-09-26
Payer: COMMERCIAL

## 2023-09-26 DIAGNOSIS — K76.0 FATTY (CHANGE OF) LIVER, NOT ELSEWHERE CLASSIFIED: ICD-10-CM

## 2023-09-26 PROCEDURE — A9585: CPT

## 2023-09-26 PROCEDURE — 76391 MR ELASTOGRAPHY: CPT

## 2023-09-26 PROCEDURE — 76391 MR ELASTOGRAPHY: CPT | Mod: 26

## 2023-09-26 PROCEDURE — 74183 MRI ABD W/O CNTR FLWD CNTR: CPT

## 2023-09-26 PROCEDURE — 74183 MRI ABD W/O CNTR FLWD CNTR: CPT | Mod: 26

## 2023-12-18 ENCOUNTER — APPOINTMENT (OUTPATIENT)
Dept: HEPATOLOGY | Facility: CLINIC | Age: 53
End: 2023-12-18

## 2024-01-08 ENCOUNTER — NON-APPOINTMENT (OUTPATIENT)
Age: 54
End: 2024-01-08

## 2024-03-12 ENCOUNTER — APPOINTMENT (OUTPATIENT)
Dept: HEPATOLOGY | Facility: CLINIC | Age: 54
End: 2024-03-12
Payer: COMMERCIAL

## 2024-03-12 VITALS
HEIGHT: 62 IN | RESPIRATION RATE: 14 BRPM | SYSTOLIC BLOOD PRESSURE: 98 MMHG | HEART RATE: 78 BPM | DIASTOLIC BLOOD PRESSURE: 61 MMHG | BODY MASS INDEX: 29.44 KG/M2 | TEMPERATURE: 97.3 F | OXYGEN SATURATION: 98 % | WEIGHT: 160 LBS

## 2024-03-12 DIAGNOSIS — K75.81 NONALCOHOLIC STEATOHEPATITIS (NASH): ICD-10-CM

## 2024-03-12 DIAGNOSIS — R74.8 ABNORMAL LEVELS OF OTHER SERUM ENZYMES: ICD-10-CM

## 2024-03-12 PROCEDURE — 99215 OFFICE O/P EST HI 40 MIN: CPT

## 2024-03-12 RX ORDER — EMPAGLIFLOZIN 25 MG/1
TABLET, FILM COATED ORAL
Refills: 0 | Status: DISCONTINUED | COMMUNITY
End: 2024-03-12

## 2024-03-12 RX ORDER — SEMAGLUTIDE 2.68 MG/ML
8 INJECTION, SOLUTION SUBCUTANEOUS
Qty: 3 | Refills: 0 | Status: ACTIVE | COMMUNITY
Start: 2022-07-26 | End: 1900-01-01

## 2024-03-12 NOTE — PHYSICAL EXAM
[Scleral Icterus] : No Scleral Icterus [Ascites Shifting Dullness] : no shifting dullness of ascites [Jaundice] : No jaundice [Non-Tender] : non-tender [General Appearance - Alert] : alert [Sclera] : the sclera and conjunctiva were normal [General Appearance - Well-Appearing] : healthy appearing [Respiration, Rhythm And Depth] : normal respiratory rhythm and effort [] : the neck was supple [Heart Rate And Rhythm] : heart rate was normal and rhythm regular [Auscultation Breath Sounds / Voice Sounds] : lungs were clear to auscultation bilaterally [Heart Sounds] : normal S1 and S2 [Bowel Sounds] : normal bowel sounds [Abdomen Soft] : soft [Abdomen Tenderness] : non-tender [Skin Color & Pigmentation] : normal skin color and pigmentation [Oriented To Time, Place, And Person] : oriented to person, place, and time

## 2024-03-12 NOTE — HISTORY OF PRESENT ILLNESS
[FreeTextEntry1] :  Services: Declined Transplant Hepatologist: Dr. Marine Vaughn Transplant Hepatology NP: Jane Tejada  Nancy Gtz is a 52 y/o female with a PMH of Hypothyroidism, Asthma, Obesity, T2DM, Abnormal liver tests 2/2 LI, here for follow-up.  Prior HPI: Patient established care with us as Dr. Farfan is no longer with the practice. Pt with hx of IL and elevated LFTs after being admitted at [facility] from 4/24-4/26/22 2/2 abdominal pain -- ultimately found to have duodenitis and transaminitis (LFTs 600 - 700's) that self resolved and duodenitis tx w/ Pantoprazole. Patient also with long standing hx of T2DM, initially maintained on Janumet and Jardiance, however, has since been switched to Ozempic + Jardiance. No episodes of liver decompensation. Prior liver bx at [facility] 2/2 abnormal liver tests (path not available). Long standing hx of obesity with heaviest weight ~ 175 lbs. Social/rare ETOH use. No IVDU. No blood transfusions. No supps/herbals. Independent in ADLs.  - Fibroscan 7/25/22 , 14.8 kPa c/w S3, F3-F4. MRE F1-F2 - 8/2022 MRE: hepatic fat fraction nl; iron none, stiffness F1-2 - 6/28/23 labs: AST/ALT 15/14, ALP 14 & HgbA1c 5.9% - 7/17/23 Fibroscan: F2/S0 - 9/26/23 MRE: mild steatosis and no fibrosis   In the interim since last visit, there have been no interim illnesses or hospitalizations. Reports compliance with Ozempic 1 mg/week, however, has had 10 lb weight gain since last visit, currently 160 lbs. Of note, patient was 175 lbs prior to starting tx. Active at work, however, not really exercising and liberal diet. No longer maintained on Jardiance for T2DM. Patient's allergies, medications, past medical, surgical, family, and social histories were reviewed and updated as appropriate. Seen in clinic today, reports that she feels well and only complaint is of chronic knee pain. Denies any recent fevers, chills, cough, lightheadedness, AMS, abdominal pain, n/v, diarrhea, hematochezia, hematemesis, and melena. Denies tobacco and recreational drug use. +Rare/social ETOH use. No supps/herbals.

## 2024-03-12 NOTE — ASSESSMENT
[FreeTextEntry1] :  Nancy Gtz is a 52 y/o female with a PMH of abnormal liver tests 2/2 LI, here for follow-up.  #LI - Patient is aware that they have several components of the metabolic syndrome including weight gain during adulthood. The benefit of a low glycemic diet and exercise were discussed. The patient needs improved treatment of diabetes, HTN, and HLD as appropriate. Long term sequelae of fatty liver disease including progression to decompensated cirrhosis and HCC explained to patient. The utility of nutrition consult explained. The role of liver biopsy also discussed. - 8/2022 MRE: hepatic fat fraction nl; iron none, stiffness F1-2 > 9/26/23 MRE noting mild steatosis and no fibrosis - Increase Ozempic to 2 mg SQ weekly to aid with weight loss given recent weight gain and for goal BMI ~ 25 - Update labs to follow liver tests (WNL based on 6/27/23 labs) and HgbA1c (6.8 > 6.2 > 5.9%) - Encouraged to start exercise regimen after clearance by PCP to aid with weight loss and reduce CV risk - Reinforced importance of judicious use of OTC meds/supps/herbals - Safe ETOH consumption reviewed and encouraged - Maintain f/u with providers for adequate management and optimization of metabolic profile  Update labs today. RTC in 6 months. Plan discussed with Dr. Vaughn.  Jane Tejada, MSN, [facility]-BC Transplant Hepatology Nurse Practitioner [facility] [address] [place] [id number] T: [phone] | F: [phone].

## 2024-03-12 NOTE — REVIEW OF SYSTEMS
[Fever] : no fever [Chills] : no chills [Recent Weight Gain (___ Lbs)] : recent [unfilled] ~Ulb weight gain [Chest Pain] : no chest pain [Palpitations] : no palpitations [Shortness Of Breath] : no shortness of breath [Abdominal Pain] : no abdominal pain [Cough] : no cough [Vomiting] : no vomiting [Constipation] : no constipation [Diarrhea] : no diarrhea [Dysuria] : no dysuria [Itching] : no itching [Dizziness] : no dizziness

## 2024-03-14 LAB
ALBUMIN SERPL ELPH-MCNC: 4.1 G/DL
ALP BLD-CCNC: 98 U/L
ALT SERPL-CCNC: 14 U/L
ANION GAP SERPL CALC-SCNC: 14 MMOL/L
AST SERPL-CCNC: 17 U/L
BILIRUB DIRECT SERPL-MCNC: 0.1 MG/DL
BILIRUB SERPL-MCNC: 0.4 MG/DL
BUN SERPL-MCNC: 17 MG/DL
CALCIUM SERPL-MCNC: 9.2 MG/DL
CHLORIDE SERPL-SCNC: 107 MMOL/L
CO2 SERPL-SCNC: 20 MMOL/L
CREAT SERPL-MCNC: 0.81 MG/DL
EGFR: 87 ML/MIN/1.73M2
ESTIMATED AVERAGE GLUCOSE: 123 MG/DL
GGT SERPL-CCNC: 12 U/L
GLUCOSE SERPL-MCNC: 99 MG/DL
HBA1C MFR BLD HPLC: 5.9 %
HCT VFR BLD CALC: 38.4 %
HGB BLD-MCNC: 13 G/DL
INR PPP: 1.03 RATIO
MCHC RBC-ENTMCNC: 30.2 PG
MCHC RBC-ENTMCNC: 33.9 GM/DL
MCV RBC AUTO: 89.1 FL
PLATELET # BLD AUTO: 226 K/UL
POTASSIUM SERPL-SCNC: 3.9 MMOL/L
PROT SERPL-MCNC: 7.1 G/DL
PT BLD: 11.6 SEC
RBC # BLD: 4.31 M/UL
RBC # FLD: 12.3 %
SODIUM SERPL-SCNC: 141 MMOL/L
WBC # FLD AUTO: 8.63 K/UL

## 2024-06-11 NOTE — ED PROVIDER NOTE - ATTESTATION, MLM
149.86 I have reviewed and confirmed nurses' notes for patient's medications, allergies, medical history, and surgical history.

## 2024-07-11 ENCOUNTER — RX RENEWAL (OUTPATIENT)
Age: 54
End: 2024-07-11

## 2024-09-09 ENCOUNTER — APPOINTMENT (OUTPATIENT)
Dept: HEPATOLOGY | Facility: CLINIC | Age: 54
End: 2024-09-09

## 2024-09-10 ENCOUNTER — APPOINTMENT (OUTPATIENT)
Dept: HEPATOLOGY | Facility: CLINIC | Age: 54
End: 2024-09-10